# Patient Record
Sex: FEMALE | Race: WHITE | NOT HISPANIC OR LATINO | Employment: OTHER | URBAN - METROPOLITAN AREA
[De-identification: names, ages, dates, MRNs, and addresses within clinical notes are randomized per-mention and may not be internally consistent; named-entity substitution may affect disease eponyms.]

---

## 2023-02-16 PROBLEM — R04.0 EPISTAXIS, RECURRENT: Status: ACTIVE | Noted: 2023-02-16

## 2023-02-16 PROBLEM — R11.0 NAUSEA IN ADULT: Status: ACTIVE | Noted: 2023-02-16

## 2023-02-16 PROBLEM — S42.209A FRACTURE, HUMERUS, PROXIMAL: Status: ACTIVE | Noted: 2023-02-16

## 2023-02-16 PROBLEM — S42.309A FRACTURE, HUMERUS, SHAFT: Status: ACTIVE | Noted: 2023-02-16

## 2023-02-16 PROBLEM — D12.6 TUBULOVILLOUS ADENOMA OF COLON: Status: ACTIVE | Noted: 2023-02-16

## 2023-02-16 PROBLEM — L85.3 DRY SKIN: Status: ACTIVE | Noted: 2023-02-16

## 2023-02-16 PROBLEM — R41.3 COMPLAINTS OF MEMORY DISTURBANCE: Status: ACTIVE | Noted: 2023-02-16

## 2023-02-16 PROBLEM — N95.2 VAGINAL ATROPHY: Status: ACTIVE | Noted: 2023-02-16

## 2023-02-16 PROBLEM — R09.81 NASAL SINUS CONGESTION: Status: ACTIVE | Noted: 2023-02-16

## 2023-02-16 PROBLEM — J06.9 ACUTE URI: Status: ACTIVE | Noted: 2023-02-16

## 2023-02-16 PROBLEM — M54.50 LOW BACK PAIN, EPISODIC: Status: ACTIVE | Noted: 2023-02-16

## 2023-02-16 PROBLEM — Z96.649 PRESENCE OF HIP JOINT PROSTHESIS: Status: ACTIVE | Noted: 2023-02-16

## 2023-02-16 PROBLEM — R29.2 HYPERREFLEXIA: Status: ACTIVE | Noted: 2023-02-16

## 2023-02-16 PROBLEM — E03.9 HYPOTHYROIDISM: Status: ACTIVE | Noted: 2023-02-16

## 2023-02-16 PROBLEM — E78.5 HYPERLIPIDEMIA: Status: ACTIVE | Noted: 2023-02-16

## 2023-02-16 PROBLEM — S90.829A BLISTER OF FOOT: Status: ACTIVE | Noted: 2023-02-16

## 2023-02-16 PROBLEM — M25.569 KNEE PAIN: Status: ACTIVE | Noted: 2023-02-16

## 2023-02-16 PROBLEM — S42.293D: Status: ACTIVE | Noted: 2023-02-16

## 2023-02-16 PROBLEM — J02.9 SORE THROAT: Status: ACTIVE | Noted: 2023-02-16

## 2023-02-16 PROBLEM — N76.0 VAGINITIS: Status: ACTIVE | Noted: 2023-02-16

## 2023-02-16 PROBLEM — K58.9 IBS (IRRITABLE BOWEL SYNDROME): Status: ACTIVE | Noted: 2023-02-16

## 2023-02-16 PROBLEM — J34.2 NASAL SEPTAL DEVIATION: Status: ACTIVE | Noted: 2023-02-16

## 2023-02-16 PROBLEM — R26.81 GAIT INSTABILITY: Status: ACTIVE | Noted: 2023-02-16

## 2023-02-16 PROBLEM — I10 BENIGN ESSENTIAL HTN: Status: ACTIVE | Noted: 2023-02-16

## 2023-02-16 PROBLEM — K21.9 ESOPHAGEAL REFLUX: Status: ACTIVE | Noted: 2023-02-16

## 2023-02-16 PROBLEM — K59.00 CONSTIPATION: Status: ACTIVE | Noted: 2023-02-16

## 2023-02-16 PROBLEM — N81.10 FEMALE BLADDER PROLAPSE: Status: ACTIVE | Noted: 2023-02-16

## 2023-02-16 PROBLEM — J30.9 ALLERGIC RHINITIS: Status: ACTIVE | Noted: 2023-02-16

## 2023-02-16 PROBLEM — M79.604 RIGHT LEG PAIN: Status: ACTIVE | Noted: 2023-02-16

## 2023-02-16 PROBLEM — K63.5 COLON POLYP: Status: ACTIVE | Noted: 2023-02-16

## 2023-02-16 PROBLEM — S62.101A WRIST FRACTURE, RIGHT: Status: ACTIVE | Noted: 2023-02-16

## 2023-02-16 PROBLEM — M25.539 WRIST PAIN: Status: ACTIVE | Noted: 2023-02-16

## 2023-02-16 PROBLEM — J34.89 NASAL OBSTRUCTION: Status: ACTIVE | Noted: 2023-02-16

## 2023-02-16 PROBLEM — R30.0 DYSURIA: Status: ACTIVE | Noted: 2023-02-16

## 2023-02-16 PROBLEM — F09 COGNITIVE DYSFUNCTION: Status: ACTIVE | Noted: 2023-02-16

## 2023-02-16 PROBLEM — S62.628A CLOSED FRACTURE OF MIDDLE PHALANX OF LITTLE FINGER: Status: ACTIVE | Noted: 2023-02-16

## 2023-02-16 PROBLEM — M81.0 OSTEOPOROSIS, POST-MENOPAUSAL: Status: ACTIVE | Noted: 2023-02-16

## 2023-02-16 PROBLEM — R29.6 FREQUENT FALLS: Status: ACTIVE | Noted: 2023-02-16

## 2023-02-16 PROBLEM — R41.3 IMPAIRED MEMORY: Status: ACTIVE | Noted: 2023-02-16

## 2023-02-16 PROBLEM — N95.1 HOT FLASHES, MENOPAUSAL: Status: ACTIVE | Noted: 2023-02-16

## 2023-02-16 PROBLEM — M54.16 LUMBAR RADICULITIS: Status: ACTIVE | Noted: 2023-02-16

## 2023-02-16 PROBLEM — L70.9 ADULT ACNE: Status: ACTIVE | Noted: 2023-02-16

## 2023-02-16 PROBLEM — R05.8 COUGH WITH SPUTUM: Status: ACTIVE | Noted: 2023-02-16

## 2023-02-16 PROBLEM — H91.90 HOH (HARD OF HEARING): Status: ACTIVE | Noted: 2023-02-16

## 2023-02-16 PROBLEM — E55.9 VITAMIN D DEFICIENCY: Status: ACTIVE | Noted: 2023-02-16

## 2023-02-16 PROBLEM — S32.000A COMPRESSION FRACTURE OF LUMBAR VERTEBRA (MULTI): Status: ACTIVE | Noted: 2023-02-16

## 2023-02-16 PROBLEM — N39.0 URINARY TRACT INFECTION: Status: ACTIVE | Noted: 2023-02-16

## 2023-02-16 PROBLEM — D64.9 ANEMIA: Status: ACTIVE | Noted: 2023-02-16

## 2023-02-16 PROBLEM — M54.9 BACK PAIN: Status: ACTIVE | Noted: 2023-02-16

## 2023-02-16 PROBLEM — K03.0 DENTAL ATTRITION, EXCESSIVE: Status: ACTIVE | Noted: 2023-02-16

## 2023-02-16 PROBLEM — R07.89 ATYPICAL CHEST PAIN: Status: ACTIVE | Noted: 2023-02-16

## 2023-02-16 PROBLEM — R39.9 UTI SYMPTOMS: Status: ACTIVE | Noted: 2023-02-16

## 2023-02-16 PROBLEM — N95.0 POSTMENOPAUSAL BLEEDING: Status: ACTIVE | Noted: 2023-02-16

## 2023-02-16 PROBLEM — M79.641 PAIN OF RIGHT HAND: Status: ACTIVE | Noted: 2023-02-16

## 2023-02-16 PROBLEM — R05.9 COUGH: Status: ACTIVE | Noted: 2023-02-16

## 2023-02-16 PROBLEM — R10.32 LEFT GROIN PAIN: Status: ACTIVE | Noted: 2023-02-16

## 2023-02-16 PROBLEM — R19.7 DIARRHEA: Status: ACTIVE | Noted: 2023-02-16

## 2023-02-16 PROBLEM — U07.1 COVID-19 VIRUS INFECTION: Status: ACTIVE | Noted: 2023-02-16

## 2023-02-16 PROBLEM — J34.3 HYPERTROPHY OF INFERIOR NASAL TURBINATE: Status: ACTIVE | Noted: 2023-02-16

## 2023-02-16 PROBLEM — H93.92 LESION OF LEFT EAR: Status: ACTIVE | Noted: 2023-02-16

## 2023-02-16 PROBLEM — M25.559 HIP PAIN: Status: ACTIVE | Noted: 2023-02-16

## 2023-02-16 PROBLEM — R93.89 ABNORMAL CHEST CT: Status: ACTIVE | Noted: 2023-02-16

## 2023-02-16 PROBLEM — I51.9 HEART PROBLEM: Status: ACTIVE | Noted: 2023-02-16

## 2023-02-16 PROBLEM — K92.1 BLOOD IN STOOL: Status: ACTIVE | Noted: 2023-02-16

## 2023-02-16 PROBLEM — M17.12 LEFT KNEE DJD: Status: ACTIVE | Noted: 2023-02-16

## 2023-02-16 PROBLEM — S52.539A CLOSED COLLES' FRACTURE: Status: ACTIVE | Noted: 2023-02-16

## 2023-02-16 PROBLEM — D12.6 TUBULAR ADENOMA OF COLON: Status: ACTIVE | Noted: 2023-02-16

## 2023-02-16 PROBLEM — M47.816 LUMBAR SPONDYLOSIS: Status: ACTIVE | Noted: 2023-02-16

## 2023-02-16 PROBLEM — N39.0 RECURRENT UTI: Status: ACTIVE | Noted: 2023-02-16

## 2023-02-16 PROBLEM — R10.9 ABDOMINAL PAIN: Status: ACTIVE | Noted: 2023-02-16

## 2023-02-16 PROBLEM — M16.9 DEGENERATIVE JOINT DISEASE (DJD) OF HIP: Status: ACTIVE | Noted: 2023-02-16

## 2023-02-16 PROBLEM — M17.9 DEGENERATIVE ARTHRITIS OF KNEE: Status: ACTIVE | Noted: 2023-02-16

## 2023-02-16 PROBLEM — I47.10 SUPRAVENTRICULAR TACHYCARDIA (CMS-HCC): Status: ACTIVE | Noted: 2023-02-16

## 2023-02-16 PROBLEM — J45.909 ASTHMA (HHS-HCC): Status: ACTIVE | Noted: 2023-02-16

## 2023-02-16 PROBLEM — M12.9 ARTHROPATHY: Status: ACTIVE | Noted: 2023-02-16

## 2023-02-16 PROBLEM — R79.0 LOW IRON STORES: Status: ACTIVE | Noted: 2023-02-16

## 2023-02-16 PROBLEM — N89.8 VAGINA ITCHING: Status: ACTIVE | Noted: 2023-02-16

## 2023-02-16 PROBLEM — K62.5 RECTAL HEMORRHAGE: Status: ACTIVE | Noted: 2023-02-16

## 2023-02-16 PROBLEM — F41.9 ANXIETY: Status: ACTIVE | Noted: 2023-02-16

## 2023-02-16 PROBLEM — J01.90 ACUTE SINUSITIS: Status: ACTIVE | Noted: 2023-02-16

## 2023-02-16 RX ORDER — METOPROLOL SUCCINATE 100 MG/1
1 TABLET, EXTENDED RELEASE ORAL DAILY
COMMUNITY
Start: 2018-09-30 | End: 2023-09-08

## 2023-02-16 RX ORDER — ADAPALENE 1 MG/G
GEL TOPICAL
COMMUNITY
Start: 2021-09-13 | End: 2023-05-24

## 2023-02-16 RX ORDER — DEXTROMETHORPHAN HYDROBROMIDE, GUAIFENESIN 5; 100 MG/5ML; MG/5ML
650 LIQUID ORAL 3 TIMES DAILY
COMMUNITY
Start: 2020-02-25 | End: 2023-05-24

## 2023-02-16 RX ORDER — LEVOTHYROXINE SODIUM 25 UG/1
25 TABLET ORAL
COMMUNITY
Start: 2014-06-20 | End: 2023-05-11 | Stop reason: SDUPTHER

## 2023-02-16 RX ORDER — ASPIRIN 81 MG/1
1 TABLET ORAL DAILY
COMMUNITY
Start: 2018-09-27 | End: 2023-05-24

## 2023-02-16 RX ORDER — ONDANSETRON 4 MG/1
4 TABLET, FILM COATED ORAL EVERY 6 HOURS PRN
COMMUNITY
End: 2023-05-24

## 2023-02-16 RX ORDER — LORATADINE 10 MG/1
1 TABLET ORAL DAILY
COMMUNITY
Start: 2018-05-01 | End: 2023-11-30 | Stop reason: SDUPTHER

## 2023-02-16 RX ORDER — NEOMYCIN SULFATE, POLYMYXIN B SULFATE, AND DEXAMETHASONE 3.5; 10000; 1 MG/G; [USP'U]/G; MG/G
OINTMENT OPHTHALMIC
COMMUNITY
Start: 2022-09-27 | End: 2023-05-24

## 2023-02-16 RX ORDER — BUSPIRONE HYDROCHLORIDE 5 MG/1
5 TABLET ORAL 2 TIMES DAILY
COMMUNITY
Start: 2021-01-13 | End: 2023-03-23 | Stop reason: SDUPTHER

## 2023-02-16 RX ORDER — MONTELUKAST SODIUM 10 MG/1
1 TABLET ORAL NIGHTLY
COMMUNITY
Start: 2014-11-24 | End: 2023-05-11 | Stop reason: SDUPTHER

## 2023-02-16 RX ORDER — SODIUM CHLORIDE 0.65 %
2 AEROSOL, SPRAY (ML) NASAL 2 TIMES DAILY
COMMUNITY
End: 2023-05-24

## 2023-02-16 RX ORDER — ERGOCALCIFEROL 1.25 MG/1
1.25 CAPSULE ORAL
COMMUNITY
Start: 2021-01-13 | End: 2023-11-30 | Stop reason: SDUPTHER

## 2023-02-16 RX ORDER — OMEPRAZOLE 40 MG/1
1 CAPSULE, DELAYED RELEASE ORAL DAILY
COMMUNITY
Start: 2016-11-28 | End: 2023-03-23 | Stop reason: SDUPTHER

## 2023-02-16 RX ORDER — CALCIUM CARBONATE 600 MG
600 TABLET ORAL 2 TIMES DAILY
COMMUNITY
Start: 2021-01-13 | End: 2023-09-22 | Stop reason: SDUPTHER

## 2023-02-16 RX ORDER — ALENDRONATE SODIUM 70 MG/1
70 TABLET ORAL
COMMUNITY
Start: 2018-06-27 | End: 2023-05-11 | Stop reason: SDUPTHER

## 2023-02-16 RX ORDER — IBUPROFEN 200 MG
200 TABLET ORAL EVERY 6 HOURS PRN
COMMUNITY
End: 2023-05-24

## 2023-02-16 RX ORDER — FLUTICASONE PROPIONATE 50 MCG
2 SPRAY, SUSPENSION (ML) NASAL DAILY
COMMUNITY
Start: 2021-06-14 | End: 2023-05-11 | Stop reason: SDUPTHER

## 2023-02-16 RX ORDER — ALBUTEROL SULFATE 90 UG/1
2 AEROSOL, METERED RESPIRATORY (INHALATION)
COMMUNITY
Start: 2013-04-03 | End: 2023-11-30 | Stop reason: SDUPTHER

## 2023-02-16 RX ORDER — DICYCLOMINE HYDROCHLORIDE 20 MG/1
1 TABLET ORAL EVERY 8 HOURS PRN
COMMUNITY
End: 2023-09-08

## 2023-03-23 DIAGNOSIS — F41.9 ANXIETY: ICD-10-CM

## 2023-03-23 DIAGNOSIS — K21.9 GASTROESOPHAGEAL REFLUX DISEASE, UNSPECIFIED WHETHER ESOPHAGITIS PRESENT: ICD-10-CM

## 2023-03-24 RX ORDER — OMEPRAZOLE 40 MG/1
40 CAPSULE, DELAYED RELEASE ORAL
Qty: 90 CAPSULE | Refills: 3 | Status: SHIPPED | OUTPATIENT
Start: 2023-03-24 | End: 2023-05-11 | Stop reason: SDUPTHER

## 2023-03-24 RX ORDER — BUSPIRONE HYDROCHLORIDE 5 MG/1
5 TABLET ORAL 2 TIMES DAILY
Qty: 180 TABLET | Refills: 3 | Status: SHIPPED | OUTPATIENT
Start: 2023-03-24 | End: 2023-05-11 | Stop reason: SDUPTHER

## 2023-03-29 ENCOUNTER — OFFICE VISIT (OUTPATIENT)
Dept: PRIMARY CARE | Facility: CLINIC | Age: 73
End: 2023-03-29
Payer: MEDICARE

## 2023-03-29 VITALS
SYSTOLIC BLOOD PRESSURE: 128 MMHG | HEART RATE: 63 BPM | RESPIRATION RATE: 18 BRPM | TEMPERATURE: 97.7 F | WEIGHT: 128 LBS | BODY MASS INDEX: 26.75 KG/M2 | OXYGEN SATURATION: 97 % | DIASTOLIC BLOOD PRESSURE: 72 MMHG

## 2023-03-29 DIAGNOSIS — I10 BENIGN ESSENTIAL HTN: ICD-10-CM

## 2023-03-29 DIAGNOSIS — F41.9 ANXIETY: ICD-10-CM

## 2023-03-29 DIAGNOSIS — M17.9 OSTEOARTHRITIS OF KNEE, UNSPECIFIED LATERALITY, UNSPECIFIED OSTEOARTHRITIS TYPE: ICD-10-CM

## 2023-03-29 DIAGNOSIS — E78.5 HYPERLIPIDEMIA, UNSPECIFIED HYPERLIPIDEMIA TYPE: Primary | ICD-10-CM

## 2023-03-29 DIAGNOSIS — E03.9 HYPOTHYROIDISM, UNSPECIFIED TYPE: ICD-10-CM

## 2023-03-29 DIAGNOSIS — F09 COGNITIVE DYSFUNCTION: ICD-10-CM

## 2023-03-29 PROCEDURE — 1159F MED LIST DOCD IN RCRD: CPT | Performed by: INTERNAL MEDICINE

## 2023-03-29 PROCEDURE — 1157F ADVNC CARE PLAN IN RCRD: CPT | Performed by: INTERNAL MEDICINE

## 2023-03-29 PROCEDURE — 1036F TOBACCO NON-USER: CPT | Performed by: INTERNAL MEDICINE

## 2023-03-29 PROCEDURE — 99214 OFFICE O/P EST MOD 30 MIN: CPT | Performed by: INTERNAL MEDICINE

## 2023-03-29 PROCEDURE — 3074F SYST BP LT 130 MM HG: CPT | Performed by: INTERNAL MEDICINE

## 2023-03-29 PROCEDURE — 3078F DIAST BP <80 MM HG: CPT | Performed by: INTERNAL MEDICINE

## 2023-03-29 ASSESSMENT — PATIENT HEALTH QUESTIONNAIRE - PHQ9
2. FEELING DOWN, DEPRESSED OR HOPELESS: NOT AT ALL
1. LITTLE INTEREST OR PLEASURE IN DOING THINGS: NOT AT ALL
SUM OF ALL RESPONSES TO PHQ9 QUESTIONS 1 AND 2: 0

## 2023-03-29 ASSESSMENT — PAIN SCALES - GENERAL: PAINLEVEL: 0-NO PAIN

## 2023-03-29 NOTE — PROGRESS NOTES
My nurse note reviewed. Patient is here for:  6 month FUV     Doing ok   Lives with    Uses 4 wheel walker    No recent falls  Patient denies any shortness of breath, PND, orthopnea, chest pain , palpitation, syncope or edema in legs  Patient denies any weakness in extremities.. Denies any headache, visual symptoms , speech problems or  tremors . No TIA or stroke like symptoms..    Now worried that her insurance might change     Objective   /72 (BP Location: Left arm, Patient Position: Sitting)   Pulse 63   Temp 36.5 °C (97.7 °F)   Resp 18   Wt 58.1 kg (128 lb)   SpO2 97%   BMI 26.75 kg/m²   Not in acute distress  CVS: S1 S2 + , no S3. No loud heart murmur appreciated. Lungs clear, No edema  Low IQ as before   Assessment:    1. Hyperlipidemia, unspecified hyperlipidemia type -hx of   2. Benign essential HTN -controlled     3. Osteoarthritis of knee, unspecified laterality, unspecified osteoarthritis type -hx of. Doing ok   4. Hypothyroidism, unspecified type -on med   5. Anxiety -hx of   6. Cognitive dysfunction -hx of . No change        Plan:   Current medications are effective. advised to continue current medications.  Blood test from October discussed   Overall doing ok  F/U in Sept for Medicare wellness

## 2023-03-29 NOTE — PATIENT INSTRUCTIONS
Plan:   Current medications are effective. advised to continue current medications.  Blood test from October discussed   Overall doing ok  F/U in Sept for Medicare wellness

## 2023-05-11 DIAGNOSIS — E03.9 HYPOTHYROIDISM, UNSPECIFIED TYPE: ICD-10-CM

## 2023-05-11 DIAGNOSIS — J45.909 MILD ASTHMA, UNSPECIFIED WHETHER COMPLICATED, UNSPECIFIED WHETHER PERSISTENT (HHS-HCC): ICD-10-CM

## 2023-05-11 DIAGNOSIS — M81.0 OSTEOPOROSIS, UNSPECIFIED OSTEOPOROSIS TYPE, UNSPECIFIED PATHOLOGICAL FRACTURE PRESENCE: Primary | ICD-10-CM

## 2023-05-11 DIAGNOSIS — F41.9 ANXIETY: ICD-10-CM

## 2023-05-11 DIAGNOSIS — K21.9 GASTROESOPHAGEAL REFLUX DISEASE, UNSPECIFIED WHETHER ESOPHAGITIS PRESENT: ICD-10-CM

## 2023-05-15 RX ORDER — BUSPIRONE HYDROCHLORIDE 5 MG/1
5 TABLET ORAL 2 TIMES DAILY
Qty: 180 TABLET | Refills: 3 | Status: SHIPPED | OUTPATIENT
Start: 2023-05-15 | End: 2023-11-30 | Stop reason: SDUPTHER

## 2023-05-15 RX ORDER — MONTELUKAST SODIUM 10 MG/1
10 TABLET ORAL NIGHTLY
Qty: 90 TABLET | Refills: 0 | Status: SHIPPED | OUTPATIENT
Start: 2023-05-15 | End: 2023-11-30 | Stop reason: SDUPTHER

## 2023-05-15 RX ORDER — FLUTICASONE PROPIONATE 50 MCG
2 SPRAY, SUSPENSION (ML) NASAL DAILY
Qty: 16 G | Refills: 0 | Status: SHIPPED | OUTPATIENT
Start: 2023-05-15 | End: 2023-11-30 | Stop reason: SDUPTHER

## 2023-05-15 RX ORDER — OMEPRAZOLE 40 MG/1
40 CAPSULE, DELAYED RELEASE ORAL
Qty: 90 CAPSULE | Refills: 3 | Status: SHIPPED | OUTPATIENT
Start: 2023-05-15 | End: 2023-11-30 | Stop reason: SDUPTHER

## 2023-05-15 RX ORDER — ALENDRONATE SODIUM 70 MG/1
70 TABLET ORAL
Qty: 13 TABLET | Refills: 3 | Status: SHIPPED | OUTPATIENT
Start: 2023-05-15 | End: 2023-05-24

## 2023-05-15 RX ORDER — LEVOTHYROXINE SODIUM 25 UG/1
25 TABLET ORAL
Qty: 90 TABLET | Refills: 0 | Status: SHIPPED | OUTPATIENT
Start: 2023-05-15 | End: 2023-08-22 | Stop reason: SDUPTHER

## 2023-05-24 ENCOUNTER — OFFICE VISIT (OUTPATIENT)
Dept: PRIMARY CARE | Facility: CLINIC | Age: 73
End: 2023-05-24
Payer: MEDICARE

## 2023-05-24 VITALS
SYSTOLIC BLOOD PRESSURE: 130 MMHG | RESPIRATION RATE: 18 BRPM | TEMPERATURE: 97.3 F | HEART RATE: 67 BPM | OXYGEN SATURATION: 97 % | WEIGHT: 130 LBS | DIASTOLIC BLOOD PRESSURE: 70 MMHG | BODY MASS INDEX: 38.07 KG/M2

## 2023-05-24 DIAGNOSIS — K92.1 BLOOD IN STOOL: ICD-10-CM

## 2023-05-24 DIAGNOSIS — E66.01 OBESITY, MORBID (MULTI): ICD-10-CM

## 2023-05-24 DIAGNOSIS — I47.10 SUPRAVENTRICULAR TACHYCARDIA (CMS-HCC): ICD-10-CM

## 2023-05-24 DIAGNOSIS — L72.0 EPIDERMAL CYST OF NECK: Primary | ICD-10-CM

## 2023-05-24 PROBLEM — M79.671 FOOT PAIN, BILATERAL: Status: ACTIVE | Noted: 2023-05-24

## 2023-05-24 PROBLEM — M54.9 BACKACHE: Status: ACTIVE | Noted: 2019-02-01

## 2023-05-24 PROBLEM — R45.89 FLAT AFFECT: Status: ACTIVE | Noted: 2023-05-24

## 2023-05-24 PROBLEM — K64.9 HEMORRHOIDS: Status: ACTIVE | Noted: 2019-02-01

## 2023-05-24 PROBLEM — R26.89 ANTALGIC GAIT: Status: ACTIVE | Noted: 2023-05-24

## 2023-05-24 PROBLEM — L03.90 CELLULITIS: Status: ACTIVE | Noted: 2019-02-01

## 2023-05-24 PROBLEM — R23.2 FLUSHING: Status: ACTIVE | Noted: 2019-02-01

## 2023-05-24 PROBLEM — K21.9 GASTROESOPHAGEAL REFLUX DISEASE: Status: ACTIVE | Noted: 2019-02-01

## 2023-05-24 PROBLEM — J32.1 CHRONIC FRONTAL SINUSITIS: Status: ACTIVE | Noted: 2019-02-01

## 2023-05-24 PROBLEM — R47.89: Status: ACTIVE | Noted: 2023-05-24

## 2023-05-24 PROBLEM — K62.5 HEMORRHAGE OF RECTUM AND ANUS: Status: ACTIVE | Noted: 2019-02-01

## 2023-05-24 PROBLEM — H90.3 SENSORINEURAL HEARING LOSS (SNHL) OF BOTH EARS: Status: ACTIVE | Noted: 2023-05-24

## 2023-05-24 PROBLEM — I10 ESSENTIAL HYPERTENSION: Status: ACTIVE | Noted: 2023-05-24

## 2023-05-24 PROBLEM — K29.90 GASTRODUODENITIS: Status: ACTIVE | Noted: 2019-02-01

## 2023-05-24 PROBLEM — J31.0 CHRONIC RHINITIS: Status: ACTIVE | Noted: 2023-05-24

## 2023-05-24 PROBLEM — K29.70 GASTRITIS: Status: ACTIVE | Noted: 2019-02-01

## 2023-05-24 PROBLEM — R10.30 LOWER ABDOMINAL PAIN: Status: ACTIVE | Noted: 2023-05-24

## 2023-05-24 PROBLEM — M20.42 ACQUIRED HAMMER TOE OF LEFT FOOT: Status: ACTIVE | Noted: 2023-05-24

## 2023-05-24 PROBLEM — G47.00 INSOMNIA: Status: ACTIVE | Noted: 2019-02-01

## 2023-05-24 PROBLEM — J32.0 CHRONIC MAXILLARY SINUSITIS: Status: ACTIVE | Noted: 2019-02-01

## 2023-05-24 PROBLEM — R53.83 MALAISE AND FATIGUE: Status: ACTIVE | Noted: 2019-02-01

## 2023-05-24 PROBLEM — I51.7 CARDIOMEGALY: Status: ACTIVE | Noted: 2023-05-24

## 2023-05-24 PROBLEM — R10.9 ABDOMINAL DISCOMFORT: Status: ACTIVE | Noted: 2023-05-24

## 2023-05-24 PROBLEM — R53.81 MALAISE AND FATIGUE: Status: ACTIVE | Noted: 2019-02-01

## 2023-05-24 PROBLEM — R10.2 FEMALE PELVIC PAIN: Status: ACTIVE | Noted: 2023-05-24

## 2023-05-24 PROBLEM — M79.672 FOOT PAIN, BILATERAL: Status: ACTIVE | Noted: 2023-05-24

## 2023-05-24 PROBLEM — K59.09 CHRONIC CONSTIPATION: Status: ACTIVE | Noted: 2023-05-24

## 2023-05-24 PROBLEM — R26.89 IMBALANCE: Status: ACTIVE | Noted: 2023-05-24

## 2023-05-24 PROBLEM — J34.89 NASAL DRYNESS: Status: ACTIVE | Noted: 2023-05-24

## 2023-05-24 PROBLEM — R04.0 EPISTAXIS: Status: ACTIVE | Noted: 2019-02-01

## 2023-05-24 PROBLEM — L60.3 DYSTROPHIC NAIL: Status: ACTIVE | Noted: 2023-05-24

## 2023-05-24 PROBLEM — N95.1 MENOPAUSAL SYMPTOMS: Status: ACTIVE | Noted: 2023-05-24

## 2023-05-24 PROBLEM — D36.9 MULTIPLE ADENOMATOUS POLYPS: Status: ACTIVE | Noted: 2023-05-24

## 2023-05-24 PROBLEM — F32.A DEPRESSIVE DISORDER: Status: ACTIVE | Noted: 2019-02-01

## 2023-05-24 PROCEDURE — 3078F DIAST BP <80 MM HG: CPT | Performed by: INTERNAL MEDICINE

## 2023-05-24 PROCEDURE — 1159F MED LIST DOCD IN RCRD: CPT | Performed by: INTERNAL MEDICINE

## 2023-05-24 PROCEDURE — 1036F TOBACCO NON-USER: CPT | Performed by: INTERNAL MEDICINE

## 2023-05-24 PROCEDURE — 99214 OFFICE O/P EST MOD 30 MIN: CPT | Performed by: INTERNAL MEDICINE

## 2023-05-24 PROCEDURE — 3075F SYST BP GE 130 - 139MM HG: CPT | Performed by: INTERNAL MEDICINE

## 2023-05-24 PROCEDURE — 1157F ADVNC CARE PLAN IN RCRD: CPT | Performed by: INTERNAL MEDICINE

## 2023-05-24 RX ORDER — MULTIVIT-MIN/IRON FUM/FOLIC AC 7.5 MG-4
1 TABLET ORAL DAILY
COMMUNITY
End: 2023-11-30 | Stop reason: SDUPTHER

## 2023-05-24 ASSESSMENT — PATIENT HEALTH QUESTIONNAIRE - PHQ9
SUM OF ALL RESPONSES TO PHQ9 QUESTIONS 1 AND 2: 0
2. FEELING DOWN, DEPRESSED OR HOPELESS: NOT AT ALL
1. LITTLE INTEREST OR PLEASURE IN DOING THINGS: NOT AT ALL

## 2023-05-24 NOTE — PROGRESS NOTES
My nurse note reviewed. Patient is here for:  Follow-up (Pt stated something on her back)       Wants back to be checked     She thinks there might be blood in stool once few weeks ago. Not anymore.   patient denies any abdominal pain, tenderness, nausea, vomiting, change in bowel habits or blood in stool.  Had colonoscopy done in 2021, showed small polyp and diverticulosis . Discussed with pt. Advised to repeat it in 5 yrs .   Hx of obesity   Hx of svt, no issue with it currently   OBJECTIVE :  /70   Pulse 67   Temp 36.3 °C (97.3 °F)   Resp 18   Wt 59 kg (130 lb)   SpO2 97%   BMI 38.07 kg/m²   There is a small black dot , puntum , from old cyst  . No redeness . No tenderness  Abdomen: soft, non tender. No organomegaly noted. BS +.       Assessment:    1. Epidermal cyst of neck -reassured   2. Obesity, morbid (CMS/HCC) -Discussed about obesity and complications related to it. Discussed about weight reduction and regular exercise to decrease weight. Questions related to it answered to patient's satisfaction.    3. Supraventricular tachycardia (CMS/HCC) -hx of . Stable now   4. Blood in stool -? One episode. Will watch it and do further work up if recur         Plan:   Reassured for cyst in back of neck  Call if any blood in stool , then will consider further evaluation  Current medications are effective. advised to continue current medications.  F/U as scheduled in Sept

## 2023-06-01 DIAGNOSIS — M81.0 OSTEOPOROSIS, POST-MENOPAUSAL: Primary | ICD-10-CM

## 2023-06-01 RX ORDER — ALENDRONATE SODIUM 70 MG/1
70 TABLET ORAL
COMMUNITY
End: 2023-06-01 | Stop reason: SDUPTHER

## 2023-06-01 RX ORDER — ALENDRONATE SODIUM 70 MG/1
70 TABLET ORAL
Qty: 13 TABLET | Refills: 3 | Status: SHIPPED | OUTPATIENT
Start: 2023-06-01 | End: 2023-11-30 | Stop reason: SDUPTHER

## 2023-06-01 NOTE — TELEPHONE ENCOUNTER
Pt. Asking for refill on Fosamax 70 mg sent to [facility].  Rx pended.    Last OV: 5/24  Next OV: 9/28

## 2023-08-22 DIAGNOSIS — E03.9 HYPOTHYROIDISM, UNSPECIFIED TYPE: ICD-10-CM

## 2023-08-22 RX ORDER — LEVOTHYROXINE SODIUM 25 UG/1
25 TABLET ORAL
Qty: 90 TABLET | Refills: 0 | Status: SHIPPED | OUTPATIENT
Start: 2023-08-22 | End: 2023-11-30 | Stop reason: SDUPTHER

## 2023-09-05 ENCOUNTER — PATIENT OUTREACH (OUTPATIENT)
Dept: CARE COORDINATION | Facility: CLINIC | Age: 73
End: 2023-09-05
Payer: COMMERCIAL

## 2023-09-05 DIAGNOSIS — I10 HYPERTENSION, BENIGN: ICD-10-CM

## 2023-09-05 DIAGNOSIS — E03.9 HYPOTHYROIDISM, UNSPECIFIED TYPE: ICD-10-CM

## 2023-09-05 DIAGNOSIS — R07.9 CHEST PAIN, UNSPECIFIED TYPE: ICD-10-CM

## 2023-09-05 DIAGNOSIS — F32.A ANXIETY AND DEPRESSION: ICD-10-CM

## 2023-09-05 DIAGNOSIS — R07.89 ATYPICAL CHEST PAIN: ICD-10-CM

## 2023-09-05 DIAGNOSIS — F41.9 ANXIETY AND DEPRESSION: ICD-10-CM

## 2023-09-05 RX ORDER — METOPROLOL SUCCINATE 25 MG/1
25 TABLET, EXTENDED RELEASE ORAL DAILY
COMMUNITY
Start: 2023-09-03 | End: 2023-09-28 | Stop reason: SDUPTHER

## 2023-09-05 RX ORDER — LOSARTAN POTASSIUM 50 MG/1
50 TABLET ORAL DAILY
COMMUNITY
Start: 2023-09-03 | End: 2023-09-28 | Stop reason: SDUPTHER

## 2023-09-05 RX ORDER — ASPIRIN 81 MG/1
81 TABLET ORAL DAILY
COMMUNITY
End: 2023-11-22 | Stop reason: SDUPTHER

## 2023-09-05 RX ORDER — ATORVASTATIN CALCIUM 80 MG/1
80 TABLET, FILM COATED ORAL NIGHTLY
COMMUNITY
Start: 2023-09-03 | End: 2023-09-28 | Stop reason: SDUPTHER

## 2023-09-05 NOTE — PROGRESS NOTES
Discharge Facility:[facility]  Discharge Diagnosis:R07.9 Chest pain, R07.89 Atypical pain, E03.9 Hypothyroidism, I10 Hypertension, benign, F41.9/F32.A Anxiety and depression  Admission Date:9/2/23  Discharge Date: 9/3/23    PCP Appointment Date:9/8/23  Specialist Appointment Date:   Hospital Encounter and Summary:  not available at this time   See discharge assessment below for further details    Engagement  Call Start Time: 1320 (9/5/2023  1:19 PM)    Medications  Medications reviewed with patient/caregiver?: Yes (9/5/2023  1:19 PM)  Is the patient having any side effects they believe may be caused by any medication additions or changes?: No (9/5/2023  1:19 PM)  Does the patient have all medications ordered at discharge?: Yes (9/5/2023  1:19 PM)  Care Management Interventions: Provided patient education (9/5/2023  1:19 PM)  Is the patient taking all medications as directed (includes completed medication regime)?: Yes (9/5/2023  1:19 PM)  Care Management Interventions: Provided patient education (9/5/2023  1:17 PM)  Medication Comments: Aspirin, Atorvastatin, Losartan, Metoprolol (9/5/2023  1:19 PM)    Appointments  Does the patient have a primary care provider?: Yes (9/5/2023  1:19 PM)  Care Management Interventions: Verified appointment date/time/provider (9/5/2023  1:19 PM)  Has the patient kept scheduled appointments due by today?: Yes (9/5/2023  1:19 PM)  Care Management Interventions: Advised patient to keep appointment; Educated on importance of keeping appointment (9/5/2023  1:19 PM)    Self Management  Has home health visited the patient within 72 hours of discharge?: Not applicable (9/5/2023  1:19 PM)    Patient Teaching  Does the patient have access to their discharge instructions?: Yes (9/5/2023  1:19 PM)  Care Management Interventions: Reviewed instructions with patient (9/5/2023  1:19 PM)  What is the patient's perception of their health status since discharge?: Same (9/5/2023  1:19 PM)  Is the  patient/caregiver able to teach back the hierarchy of who to call/visit for symptoms/problems? PCP, Specialist, Home Health nurse, Urgent Care, ED, 911: Yes (9/5/2023  1:19 PM)

## 2023-09-08 ENCOUNTER — OFFICE VISIT (OUTPATIENT)
Dept: PRIMARY CARE | Facility: CLINIC | Age: 73
End: 2023-09-08
Payer: MEDICARE

## 2023-09-08 VITALS
OXYGEN SATURATION: 96 % | HEART RATE: 72 BPM | RESPIRATION RATE: 18 BRPM | SYSTOLIC BLOOD PRESSURE: 104 MMHG | WEIGHT: 126 LBS | DIASTOLIC BLOOD PRESSURE: 64 MMHG | BODY MASS INDEX: 26.45 KG/M2 | TEMPERATURE: 96.3 F | HEIGHT: 58 IN

## 2023-09-08 DIAGNOSIS — R07.89 ATYPICAL CHEST PAIN: Primary | ICD-10-CM

## 2023-09-08 DIAGNOSIS — Z09 HOSPITAL DISCHARGE FOLLOW-UP: ICD-10-CM

## 2023-09-08 DIAGNOSIS — N60.29 FIBROADENOSIS OF BREAST, UNSPECIFIED LATERALITY: ICD-10-CM

## 2023-09-08 DIAGNOSIS — I10 BENIGN ESSENTIAL HTN: ICD-10-CM

## 2023-09-08 PROCEDURE — 3078F DIAST BP <80 MM HG: CPT | Performed by: INTERNAL MEDICINE

## 2023-09-08 PROCEDURE — 3074F SYST BP LT 130 MM HG: CPT | Performed by: INTERNAL MEDICINE

## 2023-09-08 PROCEDURE — 1125F AMNT PAIN NOTED PAIN PRSNT: CPT | Performed by: INTERNAL MEDICINE

## 2023-09-08 PROCEDURE — 99214 OFFICE O/P EST MOD 30 MIN: CPT | Performed by: INTERNAL MEDICINE

## 2023-09-08 PROCEDURE — 1157F ADVNC CARE PLAN IN RCRD: CPT | Performed by: INTERNAL MEDICINE

## 2023-09-08 PROCEDURE — G0008 ADMIN INFLUENZA VIRUS VAC: HCPCS | Performed by: INTERNAL MEDICINE

## 2023-09-08 PROCEDURE — 1159F MED LIST DOCD IN RCRD: CPT | Performed by: INTERNAL MEDICINE

## 2023-09-08 PROCEDURE — 90662 IIV NO PRSV INCREASED AG IM: CPT | Performed by: INTERNAL MEDICINE

## 2023-09-08 PROCEDURE — 1036F TOBACCO NON-USER: CPT | Performed by: INTERNAL MEDICINE

## 2023-09-08 ASSESSMENT — PAIN SCALES - GENERAL: PAINLEVEL: 9

## 2023-09-08 NOTE — PROGRESS NOTES
"My nurse note reviewed. Patient is here for:  Hospital Follow-up     She was in hosp with atypical chest pain , acute MI was ruled out. Her BP was high so started on metoprolol and losartan along with other meds. They could not do CT angio due to high D Dimer as had difficulty with IV . She is doing ok now . No sob. Gets pain in R upper chest at times . Scheduled to have mammogram in few days .   No fever, chills, sob, n,v,   Available hospital discharge summary , pertinent lab and radiological results were reviewed and discussed with patient . Questions related to it answered to patient's satisfaction.  OBJECTIVE :  /64 (BP Location: Right arm, Patient Position: Sitting, BP Cuff Size: Adult)   Pulse 72   Temp 35.7 °C (96.3 °F)   Resp 18   Ht 1.473 m (4' 10\")   Wt 57.2 kg (126 lb)   SpO2 96%   BMI 26.33 kg/m²   CVS: S1 S2 + , no S3. No loud heart murmur appreciated. Lungs clear, No edema  Thickening of breast tissue on R breast exam , tenderness+ , also some pain near R upper costal cartilage area . No nipple discharge from R breast         Assessment:  1. Atypical chest pain - ? Msk pain vs other etionlogy . Discussed with pt. Otc med for pain discussed   2. Hospital discharge follow-up    3. Benign essential HTN - controlled  With med. Continue it   4. Fibroadenosis of breast, unspecified laterality - scheduled for mammogram      Plan;  Current medications are effective. advised to continue current medications.  Available hospital discharge summary , pertinent lab and radiological results were reviewed and discussed with patient . Questions related to it answered to patient's satisfaction.  BP is under control now. Will monitor  Has appt for mammogram   Flu shot  Keep appt with me as scheduled for medicare wellness  "

## 2023-09-08 NOTE — PATIENT INSTRUCTIONS
Plan;  Current medications are effective. advised to continue current medications.  Available hospital discharge summary , pertinent lab and radiological results were reviewed and discussed with patient . Questions related to it answered to patient's satisfaction.  BP is under control now. Will monitor  Has appt for mammogram   Flu shot   Keep appt with me as scheduled for medicare wellness

## 2023-09-11 DIAGNOSIS — R10.9 ABDOMINAL PAIN, UNSPECIFIED ABDOMINAL LOCATION: Primary | ICD-10-CM

## 2023-09-11 RX ORDER — DICYCLOMINE HYDROCHLORIDE 20 MG/1
TABLET ORAL
COMMUNITY
End: 2023-09-11 | Stop reason: SDUPTHER

## 2023-09-11 RX ORDER — DICYCLOMINE HYDROCHLORIDE 20 MG/1
20 TABLET ORAL EVERY 8 HOURS PRN
Qty: 60 TABLET | Refills: 1 | Status: SHIPPED | OUTPATIENT
Start: 2023-09-11 | End: 2023-11-30 | Stop reason: SDUPTHER

## 2023-09-13 ENCOUNTER — PATIENT OUTREACH (OUTPATIENT)
Dept: CARE COORDINATION | Facility: CLINIC | Age: 73
End: 2023-09-13
Payer: COMMERCIAL

## 2023-09-13 NOTE — PROGRESS NOTES
Call regarding appt. with PCP on 9/8/23 after hospitalization.  At time of outreach call the patient feels as if their condition has improved since last visit.  Reviewed the PCP appointment with the pt and addressed any questions or concerns.

## 2023-09-22 DIAGNOSIS — M54.50 LOW BACK PAIN WITHOUT SCIATICA, UNSPECIFIED BACK PAIN LATERALITY, UNSPECIFIED CHRONICITY: Primary | ICD-10-CM

## 2023-09-22 RX ORDER — CALCIUM CARBONATE 600 MG
600 TABLET ORAL 2 TIMES DAILY
Qty: 180 TABLET | Refills: 3 | Status: SHIPPED | OUTPATIENT
Start: 2023-09-22 | End: 2023-11-30 | Stop reason: SDUPTHER

## 2023-09-28 ENCOUNTER — OFFICE VISIT (OUTPATIENT)
Dept: PRIMARY CARE | Facility: CLINIC | Age: 73
End: 2023-09-28
Payer: MEDICARE

## 2023-09-28 VITALS
TEMPERATURE: 97.2 F | WEIGHT: 127.2 LBS | SYSTOLIC BLOOD PRESSURE: 136 MMHG | HEART RATE: 57 BPM | BODY MASS INDEX: 26.58 KG/M2 | DIASTOLIC BLOOD PRESSURE: 83 MMHG | OXYGEN SATURATION: 95 %

## 2023-09-28 DIAGNOSIS — Z71.89 ADVANCE DIRECTIVE DISCUSSED WITH PATIENT: ICD-10-CM

## 2023-09-28 DIAGNOSIS — Z00.00 MEDICARE ANNUAL WELLNESS VISIT, SUBSEQUENT: Primary | ICD-10-CM

## 2023-09-28 DIAGNOSIS — I10 BENIGN ESSENTIAL HTN: ICD-10-CM

## 2023-09-28 DIAGNOSIS — Z13.6 SCREENING FOR CARDIOVASCULAR CONDITION: ICD-10-CM

## 2023-09-28 DIAGNOSIS — Z13.39 ALCOHOL SCREENING: ICD-10-CM

## 2023-09-28 DIAGNOSIS — Z00.00 ANNUAL PHYSICAL EXAM: ICD-10-CM

## 2023-09-28 DIAGNOSIS — F09 COGNITIVE DYSFUNCTION: ICD-10-CM

## 2023-09-28 DIAGNOSIS — Z13.31 DEPRESSION SCREEN: ICD-10-CM

## 2023-09-28 DIAGNOSIS — R26.81 GAIT INSTABILITY: ICD-10-CM

## 2023-09-28 DIAGNOSIS — E78.5 HYPERLIPIDEMIA, UNSPECIFIED HYPERLIPIDEMIA TYPE: ICD-10-CM

## 2023-09-28 DIAGNOSIS — E03.9 ACQUIRED HYPOTHYROIDISM: ICD-10-CM

## 2023-09-28 DIAGNOSIS — Z98.890 HISTORY OF HIP SURGERY: ICD-10-CM

## 2023-09-28 DIAGNOSIS — M25.552 PAIN OF LEFT HIP: ICD-10-CM

## 2023-09-28 PROCEDURE — 99397 PER PM REEVAL EST PAT 65+ YR: CPT | Performed by: INTERNAL MEDICINE

## 2023-09-28 PROCEDURE — G0444 DEPRESSION SCREEN ANNUAL: HCPCS | Performed by: INTERNAL MEDICINE

## 2023-09-28 PROCEDURE — 99497 ADVNCD CARE PLAN 30 MIN: CPT | Performed by: INTERNAL MEDICINE

## 2023-09-28 PROCEDURE — G0439 PPPS, SUBSEQ VISIT: HCPCS | Performed by: INTERNAL MEDICINE

## 2023-09-28 PROCEDURE — 1170F FXNL STATUS ASSESSED: CPT | Performed by: INTERNAL MEDICINE

## 2023-09-28 PROCEDURE — 99214 OFFICE O/P EST MOD 30 MIN: CPT | Performed by: INTERNAL MEDICINE

## 2023-09-28 PROCEDURE — G0442 ANNUAL ALCOHOL SCREEN 15 MIN: HCPCS | Performed by: INTERNAL MEDICINE

## 2023-09-28 PROCEDURE — G0446 INTENS BEHAVE THER CARDIO DX: HCPCS | Performed by: INTERNAL MEDICINE

## 2023-09-28 PROCEDURE — 3079F DIAST BP 80-89 MM HG: CPT | Performed by: INTERNAL MEDICINE

## 2023-09-28 PROCEDURE — 1159F MED LIST DOCD IN RCRD: CPT | Performed by: INTERNAL MEDICINE

## 2023-09-28 PROCEDURE — 1036F TOBACCO NON-USER: CPT | Performed by: INTERNAL MEDICINE

## 2023-09-28 PROCEDURE — 1125F AMNT PAIN NOTED PAIN PRSNT: CPT | Performed by: INTERNAL MEDICINE

## 2023-09-28 PROCEDURE — 3075F SYST BP GE 130 - 139MM HG: CPT | Performed by: INTERNAL MEDICINE

## 2023-09-28 RX ORDER — ATORVASTATIN CALCIUM 80 MG/1
80 TABLET, FILM COATED ORAL NIGHTLY
Qty: 90 TABLET | Refills: 3 | Status: SHIPPED | OUTPATIENT
Start: 2023-09-28 | End: 2023-10-04 | Stop reason: SDUPTHER

## 2023-09-28 RX ORDER — LOSARTAN POTASSIUM 50 MG/1
50 TABLET ORAL DAILY
Qty: 90 TABLET | Refills: 3 | Status: SHIPPED | OUTPATIENT
Start: 2023-09-28 | End: 2023-10-04 | Stop reason: SDUPTHER

## 2023-09-28 RX ORDER — METOPROLOL SUCCINATE 25 MG/1
25 TABLET, EXTENDED RELEASE ORAL DAILY
Qty: 90 TABLET | Refills: 3 | Status: SHIPPED | OUTPATIENT
Start: 2023-09-28 | End: 2023-10-04 | Stop reason: SDUPTHER

## 2023-09-28 ASSESSMENT — ACTIVITIES OF DAILY LIVING (ADL)
DOING_HOUSEWORK: INDEPENDENT
GROCERY_SHOPPING: INDEPENDENT
TAKING_MEDICATION: INDEPENDENT
DRESSING: INDEPENDENT
BATHING: INDEPENDENT
MANAGING_FINANCES: INDEPENDENT

## 2023-09-28 NOTE — PATIENT INSTRUCTIONS
Plan:  Medicare wellness done  Physical done   Mammogram results reviewed  Blood tests ordered   Upto date on bone density   Requested prescription/s refill done to the pharmacy of patient choice .  Xray of L hip ordered  F/U 6 months and in 12 months for Medicare wellness

## 2023-09-28 NOTE — PROGRESS NOTES
My nurse note reviewed. Patient is here for:  Annual Exam (Medicare Wellness)   Here for medicare wellness, physical and follow up  Here with    Hx of cognitive issues and low IQ   Patient denies any shortness of breath, PND, orthopnea, chest pain , palpitation, syncope or edema in legs  patient denies any abdominal pain, tenderness, nausea, vomiting, change in bowel habits or blood in stool.  Patient denies any weakness in extremities.. Denies any headache, visual symptoms , speech problems or  tremors . No TIA or stroke like symptoms..    Taking meds ok   Occ pain in R upper thigh pain , has had bilat hip surgery in past .     During Medicare wellness apart from looking at assessment done by nurse,  I also asked following :    Alcohol use : Alcohol screening  was  done during this visit. Patient is not having any issue with increase alcohol use . No ETOH abuse was observed by history . Doesn't drink at all    HIV test: patient was not found to be high risk for HIV     Cognitive issue : yes     Advanced Directive: Patient has advanced directive including living will and Health care power of . Health POA is brother Markell  . All questions related to it answered. Total time > 16 min.     I have reviewed all active medications patient is currently on . Questions related to medication answered to patient's satisfaction.      Over the past 2 weeks, how often have you been bothered by any of the following problems?  Little interest or pleasure in doing things: Not at all  Feeling down, depressed, or hopeless: Not at all  Functional Ability/Level of Safety  Cognitive Impairment Observed: cognitive impairment observed  Nutrition and Exercise  Current Diet: Well Balanced Diet  Adequate Fluid Intake: Yes  Caffeine: No  Exercise Frequency: Infrequently  IADL's  Grocery Shopping: Independent  Doing Housework: Independent  Taking Medication: Independent  Managing Finances: Independent  [place] Home Safety Risk  Factors  Home Safety Risk Factors: None       REVIEW OF SYSTEMS:   All other systems have been reviewed and are negative in relation to patient's complaint and other than what is mentioned in History of present illness.      During Medicare wellness patients chronic diagnosis were reviewed and questions related to it answered to patient's satisfaction . Risk factors for heart, stroke were discussed with patient . Discussion about preventative tests were done with patient also . pain issue was discussed as appropriate for patient .  ASCVD score is 14.4 %     OBJECTIVE :    /83 (BP Location: Right arm, Patient Position: Sitting, BP Cuff Size: Adult)   Pulse 57   Temp 36.2 °C (97.2 °F) (Temporal)   Wt 57.7 kg (127 lb 3.2 oz)   SpO2 95%   BMI 26.58 kg/m²   Vitals noted , has low IQ  Not in acute distress  Conj Pink, No icterus  ears exam normal  Neck:No Cervical LN enlargement, No Thyroid enlargement No carotid bruit  Lungs: good air entry bilaterally, no rales or rhonchi  CVS: S1 S2 + , no S3. No loud heart murmur heard.   Breast examination: Breasts are symmetric. No dominant or discrete suspicious masses noted in breast area.  No nipple changes or discharge noted. Has thickening of breast tissue with some tenderness  Abdomen: Soft, non tender , BS +. no organomegaly , no CVA tenderness  MSK: No spine tenderness or muscle tenderness noted on gross examination  CNS: Pt is alert, moving all 4 extremities. no motor weakness or abnormal movements noted on gross examination.  Extremities: No edema, No calf tenderness, Papo's sign negative. DTR + knee and wrists, Rhomberg's neg    Assessment:  1. Medicare annual wellness visit, subsequent  Done.       2. Annual physical exam  Done. Tests ordered      3. Alcohol screening        4. Depression screen        5. Advance directive discussed with patient        6. Screening for cardiovascular condition  Discussed       7. Benign essential HTN  losartan (Cozaar) 50 mg  tablet    metoprolol succinate XL (Toprol-XL) 25 mg 24 hr tablet      8. Hyperlipidemia, unspecified hyperlipidemia type  atorvastatin (Lipitor) 80 mg tablet    CBC and Auto Differential    Basic Metabolic Panel    Lipid Panel Non-Fasting    Thyroid Stimulating Hormone    Hepatic Function Panel      9. Gait instability  Uses 4 wheel walker       10. Acquired hypothyroidism  Hx of.       11. Cognitive dysfunction  Hx of.       12. Pain of left hip  XR hip left 2 or 3 views      13. History of hip surgery  XR hip left 2 or 3 views              Plan:  Medicare wellness done  Physical done   Mammogram results reviewed  Blood tests ordered   Upto date on bone density   Requested prescription/s refill done to the pharmacy of patient choice .  Xray of L hip ordered  F/U 6 months and in 12 months for Medicare wellness

## 2023-10-04 DIAGNOSIS — I10 BENIGN ESSENTIAL HTN: ICD-10-CM

## 2023-10-04 DIAGNOSIS — E78.5 HYPERLIPIDEMIA, UNSPECIFIED HYPERLIPIDEMIA TYPE: ICD-10-CM

## 2023-10-04 RX ORDER — LOSARTAN POTASSIUM 50 MG/1
50 TABLET ORAL DAILY
Qty: 90 TABLET | Refills: 3 | Status: SHIPPED | OUTPATIENT
Start: 2023-10-04 | End: 2023-11-30 | Stop reason: SDUPTHER

## 2023-10-04 RX ORDER — ATORVASTATIN CALCIUM 80 MG/1
80 TABLET, FILM COATED ORAL NIGHTLY
Qty: 90 TABLET | Refills: 3 | Status: SHIPPED | OUTPATIENT
Start: 2023-10-04 | End: 2023-11-30 | Stop reason: SDUPTHER

## 2023-10-04 RX ORDER — METOPROLOL SUCCINATE 25 MG/1
25 TABLET, EXTENDED RELEASE ORAL DAILY
Qty: 90 TABLET | Refills: 3 | Status: SHIPPED | OUTPATIENT
Start: 2023-10-04 | End: 2023-11-30 | Stop reason: SDUPTHER

## 2023-10-04 NOTE — TELEPHONE ENCOUNTER
Per patient, insurance will not pay for medications through [facility], needs medications resent to local pharmacy. Please send ASAP.

## 2023-10-13 ENCOUNTER — LAB (OUTPATIENT)
Dept: LAB | Facility: LAB | Age: 73
End: 2023-10-13
Payer: COMMERCIAL

## 2023-10-13 DIAGNOSIS — E78.5 HYPERLIPIDEMIA, UNSPECIFIED HYPERLIPIDEMIA TYPE: ICD-10-CM

## 2023-10-13 LAB
ALBUMIN SERPL BCP-MCNC: 4.5 G/DL (ref 3.4–5)
ALP SERPL-CCNC: 90 U/L (ref 33–136)
ALT SERPL W P-5'-P-CCNC: 19 U/L (ref 7–45)
ANION GAP SERPL CALC-SCNC: 11 MMOL/L (ref 10–20)
AST SERPL W P-5'-P-CCNC: 26 U/L (ref 9–39)
BASOPHILS # BLD AUTO: 0.04 X10*3/UL (ref 0–0.1)
BASOPHILS NFR BLD AUTO: 0.6 %
BILIRUB DIRECT SERPL-MCNC: 0.1 MG/DL (ref 0–0.3)
BILIRUB SERPL-MCNC: 0.5 MG/DL (ref 0–1.2)
BUN SERPL-MCNC: 16 MG/DL (ref 6–23)
CALCIUM SERPL-MCNC: 10.2 MG/DL (ref 8.6–10.3)
CHLORIDE SERPL-SCNC: 109 MMOL/L (ref 98–107)
CHOLEST SERPL-MCNC: 105 MG/DL (ref 0–199)
CHOLESTEROL/HDL RATIO: 2.6
CO2 SERPL-SCNC: 28 MMOL/L (ref 21–32)
CREAT SERPL-MCNC: 0.73 MG/DL (ref 0.5–1.05)
EOSINOPHIL # BLD AUTO: 0.15 X10*3/UL (ref 0–0.7)
EOSINOPHIL NFR BLD AUTO: 2.1 %
ERYTHROCYTE [DISTWIDTH] IN BLOOD BY AUTOMATED COUNT: 13.2 % (ref 11.5–14.5)
GFR SERPL CREATININE-BSD FRML MDRD: 89 ML/MIN/1.73M*2
GLUCOSE SERPL-MCNC: 84 MG/DL (ref 74–99)
HCT VFR BLD AUTO: 40.7 % (ref 36–46)
HDLC SERPL-MCNC: 40.9 MG/DL
HGB BLD-MCNC: 12.8 G/DL (ref 12–16)
IMM GRANULOCYTES # BLD AUTO: 0.02 X10*3/UL (ref 0–0.7)
IMM GRANULOCYTES NFR BLD AUTO: 0.3 % (ref 0–0.9)
LYMPHOCYTES # BLD AUTO: 2.3 X10*3/UL (ref 1.2–4.8)
LYMPHOCYTES NFR BLD AUTO: 32.9 %
MCH RBC QN AUTO: 28.8 PG (ref 26–34)
MCHC RBC AUTO-ENTMCNC: 31.4 G/DL (ref 32–36)
MCV RBC AUTO: 92 FL (ref 80–100)
MONOCYTES # BLD AUTO: 0.52 X10*3/UL (ref 0.1–1)
MONOCYTES NFR BLD AUTO: 7.4 %
NEUTROPHILS # BLD AUTO: 3.97 X10*3/UL (ref 1.2–7.7)
NEUTROPHILS NFR BLD AUTO: 56.7 %
NON-HDL CHOLESTEROL: 64 MG/DL (ref 0–149)
NRBC BLD-RTO: 0 /100 WBCS (ref 0–0)
PLATELET # BLD AUTO: 296 X10*3/UL (ref 150–450)
PMV BLD AUTO: 11 FL (ref 7.5–11.5)
POTASSIUM SERPL-SCNC: 4.4 MMOL/L (ref 3.5–5.3)
PROT SERPL-MCNC: 7 G/DL (ref 6.4–8.2)
RBC # BLD AUTO: 4.44 X10*6/UL (ref 4–5.2)
SODIUM SERPL-SCNC: 144 MMOL/L (ref 136–145)
TSH SERPL-ACNC: 1.23 MIU/L (ref 0.44–3.98)
WBC # BLD AUTO: 7 X10*3/UL (ref 4.4–11.3)

## 2023-10-13 PROCEDURE — 82465 ASSAY BLD/SERUM CHOLESTEROL: CPT

## 2023-10-13 PROCEDURE — 84443 ASSAY THYROID STIM HORMONE: CPT

## 2023-10-13 PROCEDURE — 83718 ASSAY OF LIPOPROTEIN: CPT

## 2023-10-13 PROCEDURE — 82248 BILIRUBIN DIRECT: CPT

## 2023-10-13 PROCEDURE — 36415 COLL VENOUS BLD VENIPUNCTURE: CPT

## 2023-10-13 PROCEDURE — 85025 COMPLETE CBC W/AUTO DIFF WBC: CPT

## 2023-10-13 PROCEDURE — 80053 COMPREHEN METABOLIC PANEL: CPT

## 2023-10-18 ENCOUNTER — PATIENT OUTREACH (OUTPATIENT)
Dept: CARE COORDINATION | Facility: CLINIC | Age: 73
End: 2023-10-18
Payer: COMMERCIAL

## 2023-11-07 ENCOUNTER — APPOINTMENT (OUTPATIENT)
Dept: PRIMARY CARE | Facility: CLINIC | Age: 73
End: 2023-11-07
Payer: COMMERCIAL

## 2023-11-16 ENCOUNTER — APPOINTMENT (OUTPATIENT)
Dept: PRIMARY CARE | Facility: CLINIC | Age: 73
End: 2023-11-16
Payer: COMMERCIAL

## 2023-11-22 ENCOUNTER — OFFICE VISIT (OUTPATIENT)
Dept: PRIMARY CARE | Facility: CLINIC | Age: 73
End: 2023-11-22
Payer: MEDICARE

## 2023-11-22 VITALS — WEIGHT: 120 LBS | BODY MASS INDEX: 25.08 KG/M2 | SYSTOLIC BLOOD PRESSURE: 124 MMHG | DIASTOLIC BLOOD PRESSURE: 66 MMHG

## 2023-11-22 DIAGNOSIS — F09 COGNITIVE DYSFUNCTION: ICD-10-CM

## 2023-11-22 DIAGNOSIS — E03.9 ACQUIRED HYPOTHYROIDISM: ICD-10-CM

## 2023-11-22 DIAGNOSIS — F41.9 ANXIETY: ICD-10-CM

## 2023-11-22 DIAGNOSIS — I47.10 SVT (SUPRAVENTRICULAR TACHYCARDIA) (CMS-HCC): ICD-10-CM

## 2023-11-22 DIAGNOSIS — I10 BENIGN ESSENTIAL HTN: Primary | ICD-10-CM

## 2023-11-22 PROCEDURE — 3078F DIAST BP <80 MM HG: CPT | Performed by: INTERNAL MEDICINE

## 2023-11-22 PROCEDURE — 99214 OFFICE O/P EST MOD 30 MIN: CPT | Performed by: INTERNAL MEDICINE

## 2023-11-22 PROCEDURE — 1125F AMNT PAIN NOTED PAIN PRSNT: CPT | Performed by: INTERNAL MEDICINE

## 2023-11-22 PROCEDURE — 1036F TOBACCO NON-USER: CPT | Performed by: INTERNAL MEDICINE

## 2023-11-22 PROCEDURE — 3074F SYST BP LT 130 MM HG: CPT | Performed by: INTERNAL MEDICINE

## 2023-11-22 PROCEDURE — 1159F MED LIST DOCD IN RCRD: CPT | Performed by: INTERNAL MEDICINE

## 2023-11-22 RX ORDER — ASPIRIN 81 MG/1
81 TABLET ORAL DAILY
Qty: 100 TABLET | Refills: 3 | Status: SHIPPED | OUTPATIENT
Start: 2023-11-22 | End: 2023-11-30 | Stop reason: SDUPTHER

## 2023-11-22 ASSESSMENT — PATIENT HEALTH QUESTIONNAIRE - PHQ9
2. FEELING DOWN, DEPRESSED OR HOPELESS: NOT AT ALL
SUM OF ALL RESPONSES TO PHQ9 QUESTIONS 1 AND 2: 0
1. LITTLE INTEREST OR PLEASURE IN DOING THINGS: NOT AT ALL

## 2023-11-22 NOTE — PATIENT INSTRUCTIONS
Plan  Current medications are effective. advised to continue current medications.  Overall doing well. '  Requested prescription/s refill done to the pharmacy of patient choice .  F/U in April or as needed

## 2023-11-22 NOTE — PROGRESS NOTES
My nurse note reviewed. Patient is here for:  Follow-up (Blood pressure and med refills)   Here with    Hx of low IQ  Takes meds regularly   Patient denies any shortness of breath, PND, orthopnea, chest pain , palpitation, syncope or edema in legs  Needs refill on aspirin  OBJECTIVE :  /66   Wt 54.4 kg (120 lb)   BMI 25.08 kg/m²   Low IQ as before   CVS: S1 S2 + , no S3. No loud heart murmur appreciated. Lungs clear, No edema  Abd soft, non tender    Assessment:  1. Benign essential HTN -controlled     2. Cognitive dysfunction -hx of    3. Acquired hypothyroidism -on med.    4. Anxiety -hx of.    5. SVT (supraventricular tachycardia) -hx of. Stable.              Plan  Current medications are effective. advised to continue current medications.  Overall doing well. '  Requested prescription/s refill done to the pharmacy of patient choice .  F/U in April or as needed

## 2023-11-29 ENCOUNTER — PATIENT OUTREACH (OUTPATIENT)
Dept: CARE COORDINATION | Facility: CLINIC | Age: 73
End: 2023-11-29
Payer: COMMERCIAL

## 2023-11-30 DIAGNOSIS — E78.5 HYPERLIPIDEMIA, UNSPECIFIED HYPERLIPIDEMIA TYPE: ICD-10-CM

## 2023-11-30 DIAGNOSIS — J45.909 MILD ASTHMA, UNSPECIFIED WHETHER COMPLICATED, UNSPECIFIED WHETHER PERSISTENT (HHS-HCC): ICD-10-CM

## 2023-11-30 DIAGNOSIS — I10 BENIGN ESSENTIAL HTN: ICD-10-CM

## 2023-11-30 DIAGNOSIS — M81.0 OSTEOPOROSIS, POST-MENOPAUSAL: ICD-10-CM

## 2023-11-30 DIAGNOSIS — M54.50 LOW BACK PAIN WITHOUT SCIATICA, UNSPECIFIED BACK PAIN LATERALITY, UNSPECIFIED CHRONICITY: ICD-10-CM

## 2023-11-30 DIAGNOSIS — K21.9 GASTROESOPHAGEAL REFLUX DISEASE, UNSPECIFIED WHETHER ESOPHAGITIS PRESENT: ICD-10-CM

## 2023-11-30 DIAGNOSIS — F41.9 ANXIETY: ICD-10-CM

## 2023-11-30 DIAGNOSIS — E55.9 VITAMIN D DEFICIENCY: Primary | ICD-10-CM

## 2023-11-30 DIAGNOSIS — R10.9 ABDOMINAL PAIN, UNSPECIFIED ABDOMINAL LOCATION: ICD-10-CM

## 2023-11-30 DIAGNOSIS — E03.9 HYPOTHYROIDISM, UNSPECIFIED TYPE: ICD-10-CM

## 2023-12-01 DIAGNOSIS — J45.909 MILD ASTHMA, UNSPECIFIED WHETHER COMPLICATED, UNSPECIFIED WHETHER PERSISTENT (HHS-HCC): ICD-10-CM

## 2023-12-01 RX ORDER — OMEPRAZOLE 40 MG/1
40 CAPSULE, DELAYED RELEASE ORAL
Qty: 90 CAPSULE | Refills: 3 | Status: SHIPPED | OUTPATIENT
Start: 2023-12-01 | End: 2023-12-13 | Stop reason: SDUPTHER

## 2023-12-01 RX ORDER — FLUTICASONE PROPIONATE 50 MCG
2 SPRAY, SUSPENSION (ML) NASAL DAILY
Qty: 16 G | Refills: 0 | Status: SHIPPED | OUTPATIENT
Start: 2023-12-01 | End: 2023-12-13 | Stop reason: SDUPTHER

## 2023-12-01 RX ORDER — CALCIUM CARBONATE 600 MG
TABLET ORAL
Qty: 180 TABLET | Refills: 3 | Status: SHIPPED | OUTPATIENT
Start: 2023-12-01 | End: 2023-12-13 | Stop reason: SDUPTHER

## 2023-12-01 RX ORDER — ALENDRONATE SODIUM 70 MG/1
70 TABLET ORAL
Qty: 13 TABLET | Refills: 3 | Status: SHIPPED | OUTPATIENT
Start: 2023-12-01 | End: 2023-12-13 | Stop reason: SDUPTHER

## 2023-12-01 RX ORDER — ALBUTEROL SULFATE 90 UG/1
2 AEROSOL, METERED RESPIRATORY (INHALATION) EVERY 6 HOURS PRN
Qty: 18 G | Refills: 3 | Status: SHIPPED | OUTPATIENT
Start: 2023-12-01 | End: 2023-12-04

## 2023-12-01 RX ORDER — METOPROLOL SUCCINATE 25 MG/1
25 TABLET, EXTENDED RELEASE ORAL DAILY
Qty: 90 TABLET | Refills: 3 | Status: SHIPPED | OUTPATIENT
Start: 2023-12-01 | End: 2023-12-13 | Stop reason: SDUPTHER

## 2023-12-01 RX ORDER — LOSARTAN POTASSIUM 50 MG/1
50 TABLET ORAL DAILY
Qty: 90 TABLET | Refills: 3 | Status: SHIPPED | OUTPATIENT
Start: 2023-12-01 | End: 2023-12-13 | Stop reason: SDUPTHER

## 2023-12-01 RX ORDER — LEVOTHYROXINE SODIUM 25 UG/1
25 TABLET ORAL
Qty: 90 TABLET | Refills: 0 | Status: SHIPPED | OUTPATIENT
Start: 2023-12-01 | End: 2023-12-13 | Stop reason: SDUPTHER

## 2023-12-01 RX ORDER — BUSPIRONE HYDROCHLORIDE 5 MG/1
5 TABLET ORAL 2 TIMES DAILY
Qty: 180 TABLET | Refills: 3 | Status: SHIPPED | OUTPATIENT
Start: 2023-12-01 | End: 2023-12-13 | Stop reason: SDUPTHER

## 2023-12-01 RX ORDER — MONTELUKAST SODIUM 10 MG/1
10 TABLET ORAL NIGHTLY
Qty: 90 TABLET | Refills: 0 | Status: SHIPPED | OUTPATIENT
Start: 2023-12-01 | End: 2023-12-13 | Stop reason: SDUPTHER

## 2023-12-01 RX ORDER — LORATADINE 10 MG/1
10 TABLET ORAL DAILY
Qty: 90 TABLET | Refills: 3 | Status: SHIPPED | OUTPATIENT
Start: 2023-12-01 | End: 2023-12-13 | Stop reason: SDUPTHER

## 2023-12-01 RX ORDER — MULTIVIT-MIN/IRON FUM/FOLIC AC 7.5 MG-4
1 TABLET ORAL DAILY
Qty: 90 TABLET | Refills: 3 | Status: SHIPPED | OUTPATIENT
Start: 2023-12-01 | End: 2023-12-13 | Stop reason: SDUPTHER

## 2023-12-01 RX ORDER — DICYCLOMINE HYDROCHLORIDE 20 MG/1
20 TABLET ORAL EVERY 8 HOURS PRN
Qty: 60 TABLET | Refills: 1 | Status: SHIPPED | OUTPATIENT
Start: 2023-12-01 | End: 2023-12-13 | Stop reason: SDUPTHER

## 2023-12-01 RX ORDER — ATORVASTATIN CALCIUM 80 MG/1
80 TABLET, FILM COATED ORAL NIGHTLY
Qty: 90 TABLET | Refills: 3 | Status: SHIPPED | OUTPATIENT
Start: 2023-12-01 | End: 2023-12-13 | Stop reason: SDUPTHER

## 2023-12-01 RX ORDER — ERGOCALCIFEROL 1.25 MG/1
1.25 CAPSULE ORAL
Qty: 90 CAPSULE | Refills: 3 | Status: SHIPPED | OUTPATIENT
Start: 2023-12-01 | End: 2023-12-13 | Stop reason: SDUPTHER

## 2023-12-01 RX ORDER — ASPIRIN 81 MG/1
81 TABLET ORAL DAILY
Qty: 100 TABLET | Refills: 3 | Status: SHIPPED | OUTPATIENT
Start: 2023-12-01 | End: 2023-12-13 | Stop reason: SDUPTHER

## 2023-12-04 RX ORDER — ALBUTEROL SULFATE 90 UG/1
2 AEROSOL, METERED RESPIRATORY (INHALATION) EVERY 6 HOURS PRN
Qty: 18 G | Refills: 3 | Status: SHIPPED | OUTPATIENT
Start: 2023-12-04 | End: 2023-12-13 | Stop reason: SDUPTHER

## 2023-12-08 ENCOUNTER — APPOINTMENT (OUTPATIENT)
Dept: OPHTHALMOLOGY | Facility: CLINIC | Age: 73
End: 2023-12-08
Payer: MEDICARE

## 2023-12-08 ENCOUNTER — APPOINTMENT (OUTPATIENT)
Dept: RADIOLOGY | Facility: HOSPITAL | Age: 73
End: 2023-12-08
Payer: MEDICARE

## 2023-12-08 ENCOUNTER — HOSPITAL ENCOUNTER (EMERGENCY)
Facility: HOSPITAL | Age: 73
Discharge: HOME | End: 2023-12-09
Attending: STUDENT IN AN ORGANIZED HEALTH CARE EDUCATION/TRAINING PROGRAM
Payer: MEDICARE

## 2023-12-08 DIAGNOSIS — N39.0 URINARY TRACT INFECTION WITHOUT HEMATURIA, SITE UNSPECIFIED: ICD-10-CM

## 2023-12-08 DIAGNOSIS — R07.9 CHEST PAIN, UNSPECIFIED TYPE: Primary | ICD-10-CM

## 2023-12-08 LAB
ALBUMIN SERPL BCP-MCNC: 4 G/DL (ref 3.4–5)
ALP SERPL-CCNC: 87 U/L (ref 33–136)
ALT SERPL W P-5'-P-CCNC: 13 U/L (ref 7–45)
ANION GAP SERPL CALC-SCNC: 18 MMOL/L (ref 10–20)
APPEARANCE UR: ABNORMAL
AST SERPL W P-5'-P-CCNC: 25 U/L (ref 9–39)
BACTERIA #/AREA URNS AUTO: ABNORMAL /HPF
BASOPHILS # BLD AUTO: 0.03 X10*3/UL (ref 0–0.1)
BASOPHILS NFR BLD AUTO: 0.5 %
BILIRUB SERPL-MCNC: 0.2 MG/DL (ref 0–1.2)
BILIRUB UR STRIP.AUTO-MCNC: NEGATIVE MG/DL
BUN SERPL-MCNC: 13 MG/DL (ref 6–23)
CALCIUM SERPL-MCNC: 9.1 MG/DL (ref 8.6–10.3)
CAOX CRY #/AREA UR COMP ASSIST: ABNORMAL /HPF
CARDIAC TROPONIN I PNL SERPL HS: 5 NG/L (ref 0–13)
CARDIAC TROPONIN I PNL SERPL HS: 5 NG/L (ref 0–13)
CHLORIDE SERPL-SCNC: 111 MMOL/L (ref 98–107)
CO2 SERPL-SCNC: 19 MMOL/L (ref 21–32)
COLOR UR: YELLOW
CREAT SERPL-MCNC: 0.57 MG/DL (ref 0.5–1.05)
EOSINOPHIL # BLD AUTO: 0.19 X10*3/UL (ref 0–0.4)
EOSINOPHIL NFR BLD AUTO: 3.2 %
ERYTHROCYTE [DISTWIDTH] IN BLOOD BY AUTOMATED COUNT: 13.9 % (ref 11.5–14.5)
GFR SERPL CREATININE-BSD FRML MDRD: >90 ML/MIN/1.73M*2
GLUCOSE SERPL-MCNC: 127 MG/DL (ref 74–99)
GLUCOSE UR STRIP.AUTO-MCNC: NEGATIVE MG/DL
HCT VFR BLD AUTO: 41.7 % (ref 36–46)
HGB BLD-MCNC: 13.1 G/DL (ref 12–16)
HOLD SPECIMEN: NORMAL
IMM GRANULOCYTES # BLD AUTO: 0.01 X10*3/UL (ref 0–0.5)
IMM GRANULOCYTES NFR BLD AUTO: 0.2 % (ref 0–0.9)
KETONES UR STRIP.AUTO-MCNC: NEGATIVE MG/DL
LEUKOCYTE ESTERASE UR QL STRIP.AUTO: ABNORMAL
LYMPHOCYTES # BLD AUTO: 2.39 X10*3/UL (ref 0.8–3)
LYMPHOCYTES NFR BLD AUTO: 40 %
MAGNESIUM SERPL-MCNC: 2.14 MG/DL (ref 1.6–2.4)
MCH RBC QN AUTO: 29.4 PG (ref 26–34)
MCHC RBC AUTO-ENTMCNC: 31.4 G/DL (ref 32–36)
MCV RBC AUTO: 94 FL (ref 80–100)
MONOCYTES # BLD AUTO: 0.49 X10*3/UL (ref 0.05–0.8)
MONOCYTES NFR BLD AUTO: 8.2 %
MUCOUS THREADS #/AREA URNS AUTO: ABNORMAL /LPF
NEUTROPHILS # BLD AUTO: 2.86 X10*3/UL (ref 1.6–5.5)
NEUTROPHILS NFR BLD AUTO: 47.9 %
NITRITE UR QL STRIP.AUTO: NEGATIVE
NRBC BLD-RTO: 0 /100 WBCS (ref 0–0)
PH UR STRIP.AUTO: 5 [PH]
PLATELET # BLD AUTO: 241 X10*3/UL (ref 150–450)
POTASSIUM SERPL-SCNC: 4.1 MMOL/L (ref 3.5–5.3)
PROT SERPL-MCNC: 6.6 G/DL (ref 6.4–8.2)
PROT UR STRIP.AUTO-MCNC: NEGATIVE MG/DL
RBC # BLD AUTO: 4.46 X10*6/UL (ref 4–5.2)
RBC # UR STRIP.AUTO: NEGATIVE /UL
RBC #/AREA URNS AUTO: ABNORMAL /HPF
SODIUM SERPL-SCNC: 144 MMOL/L (ref 136–145)
SP GR UR STRIP.AUTO: 1.03
UROBILINOGEN UR STRIP.AUTO-MCNC: 2 MG/DL
WBC # BLD AUTO: 6 X10*3/UL (ref 4.4–11.3)
WBC #/AREA URNS AUTO: ABNORMAL /HPF

## 2023-12-08 PROCEDURE — 85025 COMPLETE CBC W/AUTO DIFF WBC: CPT | Performed by: PHYSICIAN ASSISTANT

## 2023-12-08 PROCEDURE — 80053 COMPREHEN METABOLIC PANEL: CPT | Performed by: PHYSICIAN ASSISTANT

## 2023-12-08 PROCEDURE — 2500000004 HC RX 250 GENERAL PHARMACY W/ HCPCS (ALT 636 FOR OP/ED): Performed by: STUDENT IN AN ORGANIZED HEALTH CARE EDUCATION/TRAINING PROGRAM

## 2023-12-08 PROCEDURE — 81003 URINALYSIS AUTO W/O SCOPE: CPT | Performed by: PHYSICIAN ASSISTANT

## 2023-12-08 PROCEDURE — 71046 X-RAY EXAM CHEST 2 VIEWS: CPT | Mod: FOREIGN READ | Performed by: RADIOLOGY

## 2023-12-08 PROCEDURE — 36415 COLL VENOUS BLD VENIPUNCTURE: CPT | Performed by: PHYSICIAN ASSISTANT

## 2023-12-08 PROCEDURE — 84484 ASSAY OF TROPONIN QUANT: CPT | Performed by: PHYSICIAN ASSISTANT

## 2023-12-08 PROCEDURE — 87086 URINE CULTURE/COLONY COUNT: CPT | Mod: PARLAB | Performed by: PHYSICIAN ASSISTANT

## 2023-12-08 PROCEDURE — 74176 CT ABD & PELVIS W/O CONTRAST: CPT

## 2023-12-08 PROCEDURE — 74176 CT ABD & PELVIS W/O CONTRAST: CPT | Mod: FOREIGN READ | Performed by: RADIOLOGY

## 2023-12-08 PROCEDURE — 99285 EMERGENCY DEPT VISIT HI MDM: CPT | Mod: 25

## 2023-12-08 PROCEDURE — 83735 ASSAY OF MAGNESIUM: CPT | Performed by: PHYSICIAN ASSISTANT

## 2023-12-08 PROCEDURE — 96365 THER/PROPH/DIAG IV INF INIT: CPT

## 2023-12-08 PROCEDURE — 71046 X-RAY EXAM CHEST 2 VIEWS: CPT | Mod: FR

## 2023-12-08 PROCEDURE — 99284 EMERGENCY DEPT VISIT MOD MDM: CPT | Performed by: STUDENT IN AN ORGANIZED HEALTH CARE EDUCATION/TRAINING PROGRAM

## 2023-12-08 RX ORDER — CEFTRIAXONE 1 G/50ML
1 INJECTION, SOLUTION INTRAVENOUS ONCE
Status: COMPLETED | OUTPATIENT
Start: 2023-12-08 | End: 2023-12-08

## 2023-12-08 RX ORDER — LIDOCAINE 560 MG/1
1 PATCH PERCUTANEOUS; TOPICAL; TRANSDERMAL DAILY
Status: DISCONTINUED | OUTPATIENT
Start: 2023-12-09 | End: 2023-12-09 | Stop reason: HOSPADM

## 2023-12-08 RX ADMIN — CEFTRIAXONE SODIUM 1 G: 1 INJECTION, SOLUTION INTRAVENOUS at 22:38

## 2023-12-08 ASSESSMENT — PAIN DESCRIPTION - ORIENTATION: ORIENTATION: LEFT

## 2023-12-08 ASSESSMENT — LIFESTYLE VARIABLES
HAVE PEOPLE ANNOYED YOU BY CRITICIZING YOUR DRINKING: NO
EVER FELT BAD OR GUILTY ABOUT YOUR DRINKING: NO
HAVE YOU EVER FELT YOU SHOULD CUT DOWN ON YOUR DRINKING: NO
EVER HAD A DRINK FIRST THING IN THE MORNING TO STEADY YOUR NERVES TO GET RID OF A HANGOVER: NO
REASON UNABLE TO ASSESS: NO

## 2023-12-08 ASSESSMENT — PAIN DESCRIPTION - ONSET: ONSET: GRADUAL

## 2023-12-08 ASSESSMENT — PAIN - FUNCTIONAL ASSESSMENT: PAIN_FUNCTIONAL_ASSESSMENT: 0-10

## 2023-12-08 ASSESSMENT — PAIN DESCRIPTION - FREQUENCY: FREQUENCY: CONSTANT/CONTINUOUS

## 2023-12-08 ASSESSMENT — PAIN DESCRIPTION - DESCRIPTORS: DESCRIPTORS: SHARP

## 2023-12-08 ASSESSMENT — COLUMBIA-SUICIDE SEVERITY RATING SCALE - C-SSRS
1. IN THE PAST MONTH, HAVE YOU WISHED YOU WERE DEAD OR WISHED YOU COULD GO TO SLEEP AND NOT WAKE UP?: NO
2. HAVE YOU ACTUALLY HAD ANY THOUGHTS OF KILLING YOURSELF?: NO
6. HAVE YOU EVER DONE ANYTHING, STARTED TO DO ANYTHING, OR PREPARED TO DO ANYTHING TO END YOUR LIFE?: NO

## 2023-12-08 ASSESSMENT — PAIN DESCRIPTION - PROGRESSION: CLINICAL_PROGRESSION: GRADUALLY WORSENING

## 2023-12-08 ASSESSMENT — PAIN DESCRIPTION - LOCATION: LOCATION: OTHER (COMMENT)

## 2023-12-08 ASSESSMENT — PAIN SCALES - GENERAL: PAINLEVEL_OUTOF10: 6

## 2023-12-08 ASSESSMENT — PAIN DESCRIPTION - PAIN TYPE: TYPE: ACUTE PAIN

## 2023-12-08 NOTE — ED TRIAGE NOTES
TRIAGE NOTE   I saw the patient as the Clinician in Triage and performed a brief history and physical exam, established acuity, and ordered appropriate tests to develop basic plan of care. Patient will be seen by an [facility], resident and/or physician who will independently evaluate the patient. Please see subsequent provider notes for further details and disposition.     Brief HPI: In brief, Halley Clements is a 73 y.o. female that presents for left-sided chest pain and around rib cage.  Patient thinks maybe it happened after she was lifting a heavy tub of close.  Patient states though she has been out of all of her medicines for the last 5 weeks.  She does not have a list with her.  Her primary care doctor is Dr. Branden raza.  She states she has tried to get a hold of them to no avail.  She denies any nausea or vomiting any weakness any headache any abdominal pain any bowel or bladder changes.  Dates she does have shoulder pain as well on the left side.  Chronic for her.       Focused PE:  - Constitutional: Alert and oriented x3.  In no acute distress, well-nourished and hydrated.  Cooperative.  - Skin: Pink, warm and dry.    -Neurological: Neurologically intact.    - Musculoskeletal: CRISTI x4, Normal gait. MSP´s intact.    - Cardiac: Regular rate rhythm.  - Pulmonary: Lungs clear bilaterally. No rales, rhonchi or wheezing.  No stridor or accessory muscle use.  - Abdomen: Abdomen soft and nontender with bowel sounds.  No rebound or guarding.  No CVA tenderness.    Note, physical exam may be limited by patient positioning sitting up in a chair.    Plan/MDM: I examined the patient in triage due to high volumes in the ER.  Labs, testing , and initial imaging based upon reported CC and focused exam      - For the remainder of the patient's workup and ED course, please see the main ED provider note. We discussed need for diagnostic testing including laboratory studies and imaging. We also discussed that they may be asked to wait  in the waiting room while these tests are pending. They understand that if they choose to leave without having the testing completed or resulted that we cannot rule out acute life threatening illnesses and the risks involved could lead to worsening condition, permanent disability or even death

## 2023-12-09 VITALS
DIASTOLIC BLOOD PRESSURE: 103 MMHG | OXYGEN SATURATION: 95 % | BODY MASS INDEX: 27.29 KG/M2 | HEIGHT: 58 IN | WEIGHT: 130 LBS | TEMPERATURE: 97.9 F | RESPIRATION RATE: 19 BRPM | HEART RATE: 72 BPM | SYSTOLIC BLOOD PRESSURE: 165 MMHG

## 2023-12-09 RX ORDER — CEPHALEXIN 500 MG/1
500 CAPSULE ORAL 2 TIMES DAILY
Qty: 20 CAPSULE | Refills: 0 | Status: SHIPPED | OUTPATIENT
Start: 2023-12-09 | End: 2023-12-19

## 2023-12-09 RX ORDER — LIDOCAINE 560 MG/1
1 PATCH PERCUTANEOUS; TOPICAL; TRANSDERMAL DAILY
Qty: 5 PATCH | Refills: 0 | Status: SHIPPED | OUTPATIENT
Start: 2023-12-09 | End: 2023-12-13 | Stop reason: SDUPTHER

## 2023-12-09 NOTE — ED PROVIDER NOTES
HPI   Chief Complaint   Patient presents with   • Flank Pain       Patient is a 73-year-old female past medical history of irritable bowel syndrome, hypertension presenting to the emergency department for left-sided chest pain.  She states that it is around her lower rib cage.  She was lifting up a heavy tub and subsequently developed pain on that side few days prior.  She has tried medications however has not had much relief.  She presented to the ER today for evaluation.  She denies any flank discomfort she denies any dysuria hematuria nausea vomiting abdominal pain or any other muscle aches and pains at this time.                          Benicia Coma Scale Score: 15                  Patient History   Past Medical History:   Diagnosis Date   • Age-related osteoporosis without current pathological fracture 11/07/2013    Osteoporosis   • Allergy, unspecified, initial encounter 11/07/2013    Allergic reaction   • Cellulitis, unspecified 11/07/2013    Cellulitis   • Chronic frontal sinusitis 11/07/2013    Chronic frontal sinusitis   • Conjunctival hemorrhage, unspecified eye 11/07/2013    Subconjunctival hemorrhage   • Contusion of unspecified thigh, initial encounter 11/07/2013    Contusion of thigh   • Depression, unspecified 11/07/2013    Depression   • Disorder of the skin and subcutaneous tissue, unspecified 11/07/2013    Skin disorder   • Encounter for immunization 11/07/2013    Need for DTP vaccine   • Encounter for screening for malignant neoplasm of colon     Encounter for screening colonoscopy   • Epistaxis 11/07/2013    Epistaxis   • Erythema intertrigo 11/07/2013    Intertrigo   • Flushing 11/07/2013    Flushing   • Furuncle of perineum 11/07/2013    Furuncle of perineum   • Gastritis, unspecified, without bleeding 11/07/2013    Gastritis   • Herpesviral infection, unspecified 11/07/2013    Herpes simplex   • Noninfective gastroenteritis and colitis, unspecified 11/07/2013    Noninfectious gastroenteritis    • Other allergy status, other than to drugs and biological substances     History of environmental allergies   • Other conditions influencing health status 11/07/2013    A Fall   • Other conditions influencing health status 11/07/2013    Rodent Bite   • Other fatigue 11/07/2013    Fatigue   • Other specified disorders of nose and nasal sinuses 10/19/2020    Nasal valve stenosis   • Other vitreous opacities, unspecified eye 11/07/2013    Vitreous floaters   • Otitis media, unspecified, unspecified ear 11/07/2013    Otitis media   • Pain in unspecified hip 11/07/2013    Joint pain, hip   • Person consulting for explanation of examination or test findings     Visit for review of DEXA scan   • Personal history of other diseases of the circulatory system     History of hypertension   • Personal history of other diseases of the female genital tract 10/04/2017    History of postmenopausal bleeding   • Personal history of other diseases of the female genital tract 02/26/2016    History of postmenopausal bleeding   • Personal history of other diseases of the musculoskeletal system and connective tissue 11/07/2013    History of backache   • Personal history of other diseases of the respiratory system 09/28/2022    History of deviated nasal septum   • Personal history of other diseases of the respiratory system 04/14/2015    History of acute sinusitis   • Personal history of other medical treatment     History of mammogram   • Personal history of other specified conditions     History of snoring   • Pneumonia, unspecified organism 11/07/2013    Pneumonitis   • Postmenopausal bleeding 11/07/2013    Postmenopausal bleeding   • Tinea pedis 11/07/2013    Tinea pedis   • Unspecified asthma, uncomplicated 11/07/2013    Asthma   • Unspecified blepharitis unspecified eye, unspecified eyelid 11/07/2013    Blepharitis   • Unspecified hemorrhoids 11/07/2013    Hemorrhoids   • Unspecified mental disorder due to known physiological  condition 09/20/2017    Cognitive dysfunction   • Unspecified nonsuppurative otitis media, unspecified ear 11/07/2013    Nonsuppurative otitis media   • Unspecified open wound of unspecified hand, initial encounter 11/07/2013    Open wound of hand   • Unspecified osteoarthritis, unspecified site 07/02/2014    Osteoarthrosis, unspecified whether generalized or localized, other specified sites   • Wedge compression fracture of T11-T12 vertebra, initial encounter for closed fracture (CMS/[facility]) 12/01/2021    Wedge compression fracture of T11-T12 vertebra, initial encounter for closed fracture     Past Surgical History:   Procedure Laterality Date   • HYSTERECTOMY  02/26/2016    Hysterectomy   • OTHER SURGICAL HISTORY  03/03/2014    Vaccines Prophylactic Need Against Bacterial Diseases   • OTHER SURGICAL HISTORY  07/18/2018    Reported Hx Of Hip Replacement - Bilateral   • OTHER SURGICAL HISTORY  11/07/2022    Arm surgery   • OTHER SURGICAL HISTORY  02/26/2016    Brain Surgery   • TUBAL LIGATION  10/04/2017    Tubal Ligation     Family History   Problem Relation Name Age of Onset   • Cancer Mother       Social History     Tobacco Use   • Smoking status: Never   • Smokeless tobacco: Never   Substance Use Topics   • Alcohol use: Not on file   • Drug use: Not on file       Physical Exam   ED Triage Vitals [12/08/23 1811]   Temp Heart Rate Resp BP   36.6 °C (97.9 °F) 81 15 169/83      SpO2 Temp src Heart Rate Source Patient Position   96 % -- -- --      BP Location FiO2 (%)     -- --       Physical Exam  Vitals reviewed.   Constitutional:       Appearance: Normal appearance.   HENT:      Head: Normocephalic and atraumatic.      Nose: Nose normal.      Mouth/Throat:      Mouth: Mucous membranes are moist.   Eyes:      Extraocular Movements: Extraocular movements intact.      Conjunctiva/sclera: Conjunctivae normal.   Cardiovascular:      Rate and Rhythm: Normal rate and regular rhythm.      Pulses: Normal pulses.      Heart  sounds: Normal heart sounds.   Pulmonary:      Effort: Pulmonary effort is normal.      Breath sounds: Normal breath sounds.   Chest:      Chest wall: Tenderness (left side) present.   Abdominal:      General: Abdomen is flat. Bowel sounds are normal.      Palpations: Abdomen is soft.   Musculoskeletal:         General: Normal range of motion.   Skin:     General: Skin is warm and dry.   Neurological:      Mental Status: She is alert and oriented to person, place, and time. Mental status is at baseline.      Cranial Nerves: Cranial nerves 2-12 are intact.      Sensory: Sensation is intact.      Motor: Motor function is intact.   Psychiatric:         Mood and Affect: Mood normal.         ED Course & MDM   ED Course as of 12/09/23 0036   Fri Dec 08, 2023   1945 73-year-old female history of irritable bowel syndrome hypertension presents emergency department for left-sided chest pain.  On examination vital signs are stable equal breath sounds bilaterally 2+ peripheral pulses no CVA tenderness.  Left rib tenderness to palpation.  Equal breath sounds bilaterally.  No crepitus to palpation.  2+ peripheral pulses differential diagnosis includes costochondritis.  Differential considered however unlikely as ACS.  Patient's presentation is not consistent with pulmonary embolism.  Workup was ordered by provider in triage which included urinalysis CBC CMP EKG troponin and chest x-ray.  Patient's EKG on my interpretation reveals sinus rhythm with a Q wave in lead III otherwise no acute ischemic changes sinus rhythm ventricular rate 66  QRS 82 QTc 436 [ZS]   Sat Dec 09, 2023   0033 Workup is revealed a urinary tract infection there is some calcium oxalate as well as the steroids with concern for nephrolithiasis CT imaging showed no evidence of nephrolithiasis on my interpretation official read showed no evidence of calculus.  There is not on emergent findings.  Patient has no midline tenderness to spine thus I do not  believe that there is any acute spinal process.  Patient's chest x-ray showed no acute cardiopulmonary process there is some linear scarring on official read at the lung base however that does not correlate with patient's symptoms.  Troponin was negative x 2 CBC showed no signs of leukocytosis anemia or thrombocytopenia CMP showed no acute findings. [ZS]      ED Course User Index  [ZS] Jodie Snell MD         Diagnoses as of 12/09/23 0036   Chest pain, unspecified type   Urinary tract infection without hematuria, site unspecified       Medical Decision Making      Procedure  Procedures     Jodie Snell MD  12/09/23 0036

## 2023-12-09 NOTE — DISCHARGE INSTRUCTIONS
You are found to have elevated blood pressure here in the emergency department I recommend that you follow-up with your primary care doctor soon as possible to have your blood pressure reevaluated and see if you require either a change in medication or be started on medication.     You have been evaluated in the Emergency Department for chest pain. There are many different causes of chest pain. Some of these causes could be serious, such as a heart attack or blood clot in the lungs, but many other causes are not life threatening, such as heartburn, viral infections, bruised bones/muscles, etc. On evaluation we did not find any emergent causes for your chest pain at this time.     Please return to Emergency Department or seek medical attention immediately if you have acute worsening in your chest pain or develop shortness of breath, repeated vomiting, fever, altered level of consciousness, coughing up blood, or start sweating and feel clammy.    If you were prescribed any medicine for home, please take as prescribed by your health-care provider. If you were given any follow-up appointments or numbers to call, please do so as instructed. Avoid any activities that bring on the chest pain. Also, avoid any tobacco products or excessive alcohol. Please ensure understanding of these instructions prior to discharge.    Please call your primary care physician and follow-up with them in the next 1 to 2 days.     You are also found to have urinary tract infection and I recommend that you follow-up with your doctor in the next 1 to 2 days.

## 2023-12-10 LAB — BACTERIA UR CULT: NORMAL

## 2023-12-11 ENCOUNTER — HOSPITAL ENCOUNTER (OUTPATIENT)
Dept: CARDIOLOGY | Facility: HOSPITAL | Age: 73
Discharge: HOME | End: 2023-12-11
Payer: MEDICARE

## 2023-12-11 PROCEDURE — 93005 ELECTROCARDIOGRAM TRACING: CPT

## 2023-12-13 ENCOUNTER — APPOINTMENT (OUTPATIENT)
Dept: PRIMARY CARE | Facility: CLINIC | Age: 73
End: 2023-12-13

## 2023-12-13 DIAGNOSIS — E78.5 HYPERLIPIDEMIA, UNSPECIFIED HYPERLIPIDEMIA TYPE: ICD-10-CM

## 2023-12-13 DIAGNOSIS — F41.9 ANXIETY: ICD-10-CM

## 2023-12-13 DIAGNOSIS — R07.9 CHEST PAIN, UNSPECIFIED TYPE: ICD-10-CM

## 2023-12-13 DIAGNOSIS — K21.9 GASTROESOPHAGEAL REFLUX DISEASE, UNSPECIFIED WHETHER ESOPHAGITIS PRESENT: ICD-10-CM

## 2023-12-13 DIAGNOSIS — R10.9 ABDOMINAL PAIN, UNSPECIFIED ABDOMINAL LOCATION: ICD-10-CM

## 2023-12-13 DIAGNOSIS — M81.0 OSTEOPOROSIS, POST-MENOPAUSAL: ICD-10-CM

## 2023-12-13 DIAGNOSIS — M54.50 LOW BACK PAIN WITHOUT SCIATICA, UNSPECIFIED BACK PAIN LATERALITY, UNSPECIFIED CHRONICITY: ICD-10-CM

## 2023-12-13 DIAGNOSIS — I10 BENIGN ESSENTIAL HTN: ICD-10-CM

## 2023-12-13 DIAGNOSIS — E03.9 HYPOTHYROIDISM, UNSPECIFIED TYPE: ICD-10-CM

## 2023-12-13 DIAGNOSIS — J45.909 MILD ASTHMA, UNSPECIFIED WHETHER COMPLICATED, UNSPECIFIED WHETHER PERSISTENT (HHS-HCC): ICD-10-CM

## 2023-12-13 DIAGNOSIS — E55.9 VITAMIN D DEFICIENCY: ICD-10-CM

## 2023-12-13 RX ORDER — ALBUTEROL SULFATE 90 UG/1
2 AEROSOL, METERED RESPIRATORY (INHALATION) EVERY 6 HOURS PRN
Qty: 18 G | Refills: 3 | Status: SHIPPED | OUTPATIENT
Start: 2023-12-13

## 2023-12-13 RX ORDER — OMEPRAZOLE 40 MG/1
40 CAPSULE, DELAYED RELEASE ORAL
Qty: 90 CAPSULE | Refills: 3 | Status: SHIPPED | OUTPATIENT
Start: 2023-12-13

## 2023-12-13 RX ORDER — MULTIVIT-MIN/IRON FUM/FOLIC AC 7.5 MG-4
1 TABLET ORAL DAILY
Qty: 90 TABLET | Refills: 3 | Status: SHIPPED | OUTPATIENT
Start: 2023-12-13

## 2023-12-13 RX ORDER — ATORVASTATIN CALCIUM 80 MG/1
80 TABLET, FILM COATED ORAL NIGHTLY
Qty: 90 TABLET | Refills: 3 | Status: SHIPPED | OUTPATIENT
Start: 2023-12-13 | End: 2024-01-18 | Stop reason: SDUPTHER

## 2023-12-13 RX ORDER — LOSARTAN POTASSIUM 50 MG/1
50 TABLET ORAL DAILY
Qty: 90 TABLET | Refills: 3 | Status: SHIPPED | OUTPATIENT
Start: 2023-12-13 | End: 2023-12-15 | Stop reason: SDUPTHER

## 2023-12-13 RX ORDER — METOPROLOL SUCCINATE 25 MG/1
25 TABLET, EXTENDED RELEASE ORAL DAILY
Qty: 90 TABLET | Refills: 3 | Status: SHIPPED | OUTPATIENT
Start: 2023-12-13 | End: 2024-01-18 | Stop reason: SDUPTHER

## 2023-12-13 RX ORDER — MONTELUKAST SODIUM 10 MG/1
10 TABLET ORAL NIGHTLY
Qty: 90 TABLET | Refills: 0 | Status: SHIPPED | OUTPATIENT
Start: 2023-12-13 | End: 2024-02-21

## 2023-12-13 RX ORDER — CALCIUM CARBONATE 600 MG
TABLET ORAL
Qty: 180 TABLET | Refills: 3 | Status: SHIPPED | OUTPATIENT
Start: 2023-12-13

## 2023-12-13 RX ORDER — ALENDRONATE SODIUM 70 MG/1
70 TABLET ORAL
Qty: 13 TABLET | Refills: 3 | Status: SHIPPED | OUTPATIENT
Start: 2023-12-13

## 2023-12-13 RX ORDER — BUSPIRONE HYDROCHLORIDE 5 MG/1
5 TABLET ORAL 2 TIMES DAILY
Qty: 180 TABLET | Refills: 3 | Status: SHIPPED | OUTPATIENT
Start: 2023-12-13

## 2023-12-13 RX ORDER — ERGOCALCIFEROL 1.25 MG/1
1.25 CAPSULE ORAL
Qty: 90 CAPSULE | Refills: 3 | Status: SHIPPED | OUTPATIENT
Start: 2023-12-13

## 2023-12-13 RX ORDER — LEVOTHYROXINE SODIUM 25 UG/1
25 TABLET ORAL
Qty: 90 TABLET | Refills: 0 | Status: SHIPPED | OUTPATIENT
Start: 2023-12-13 | End: 2024-02-21

## 2023-12-13 RX ORDER — LORATADINE 10 MG/1
10 TABLET ORAL DAILY
Qty: 90 TABLET | Refills: 3 | Status: SHIPPED | OUTPATIENT
Start: 2023-12-13

## 2023-12-13 RX ORDER — FLUTICASONE PROPIONATE 50 MCG
2 SPRAY, SUSPENSION (ML) NASAL DAILY
Qty: 16 G | Refills: 0 | Status: SHIPPED | OUTPATIENT
Start: 2023-12-13

## 2023-12-13 RX ORDER — LIDOCAINE 560 MG/1
1 PATCH PERCUTANEOUS; TOPICAL; TRANSDERMAL DAILY
Qty: 5 PATCH | Refills: 0 | Status: SHIPPED | OUTPATIENT
Start: 2023-12-13 | End: 2023-12-18

## 2023-12-13 RX ORDER — DICYCLOMINE HYDROCHLORIDE 20 MG/1
20 TABLET ORAL EVERY 8 HOURS PRN
Qty: 60 TABLET | Refills: 1 | Status: SHIPPED | OUTPATIENT
Start: 2023-12-13

## 2023-12-13 RX ORDER — ASPIRIN 81 MG/1
81 TABLET ORAL DAILY
Qty: 100 TABLET | Refills: 3 | Status: SHIPPED | OUTPATIENT
Start: 2023-12-13

## 2023-12-14 PROBLEM — F33.1 MAJOR DEPRESSIVE DISORDER, RECURRENT, MODERATE (MULTI): Status: ACTIVE | Noted: 2017-12-05

## 2023-12-14 PROBLEM — R29.6 REPEATED FALLS: Status: ACTIVE | Noted: 2017-12-05

## 2023-12-14 PROBLEM — M62.81 MUSCLE WEAKNESS (GENERALIZED): Status: ACTIVE | Noted: 2017-12-05

## 2023-12-15 ENCOUNTER — OFFICE VISIT (OUTPATIENT)
Dept: CARDIOLOGY | Facility: CLINIC | Age: 73
End: 2023-12-15
Payer: MEDICARE

## 2023-12-15 VITALS
HEIGHT: 58 IN | WEIGHT: 123.8 LBS | HEART RATE: 64 BPM | BODY MASS INDEX: 25.98 KG/M2 | SYSTOLIC BLOOD PRESSURE: 150 MMHG | OXYGEN SATURATION: 98 % | DIASTOLIC BLOOD PRESSURE: 70 MMHG

## 2023-12-15 DIAGNOSIS — E78.5 HYPERLIPIDEMIA, UNSPECIFIED HYPERLIPIDEMIA TYPE: ICD-10-CM

## 2023-12-15 DIAGNOSIS — Z09 HOSPITAL DISCHARGE FOLLOW-UP: ICD-10-CM

## 2023-12-15 DIAGNOSIS — I10 BENIGN ESSENTIAL HTN: ICD-10-CM

## 2023-12-15 DIAGNOSIS — R07.89 ATYPICAL CHEST PAIN: Primary | ICD-10-CM

## 2023-12-15 PROCEDURE — 1036F TOBACCO NON-USER: CPT | Performed by: INTERNAL MEDICINE

## 2023-12-15 PROCEDURE — 1125F AMNT PAIN NOTED PAIN PRSNT: CPT | Performed by: INTERNAL MEDICINE

## 2023-12-15 PROCEDURE — 1160F RVW MEDS BY RX/DR IN RCRD: CPT | Performed by: INTERNAL MEDICINE

## 2023-12-15 PROCEDURE — 99215 OFFICE O/P EST HI 40 MIN: CPT | Performed by: INTERNAL MEDICINE

## 2023-12-15 PROCEDURE — 3078F DIAST BP <80 MM HG: CPT | Performed by: INTERNAL MEDICINE

## 2023-12-15 PROCEDURE — 1159F MED LIST DOCD IN RCRD: CPT | Performed by: INTERNAL MEDICINE

## 2023-12-15 PROCEDURE — 3074F SYST BP LT 130 MM HG: CPT | Performed by: INTERNAL MEDICINE

## 2023-12-15 RX ORDER — LOSARTAN POTASSIUM 100 MG/1
100 TABLET ORAL DAILY
Qty: 90 TABLET | Refills: 3 | Status: SHIPPED | OUTPATIENT
Start: 2023-12-15 | End: 2024-04-15 | Stop reason: SDUPTHER

## 2023-12-15 NOTE — PROGRESS NOTES
"Referred by  No ref. provider found for NEW PT/ ER F/U     History Of Present Illness:    Halley Clements is a 73 y.o. female  with HTN/HLD/asthma but no other CV dz (NL 3/2021 stress test)presenting with CP.  To ED 12/8/23 presenting with \"pain all over\"  Activity limited-uses walker    Some SOB-uses inhaler  Denies dizziness/LH edema        Past Medical History:  She has a past medical history of Age-related osteoporosis without current pathological fracture (11/07/2013), Allergy, unspecified, initial encounter (11/07/2013), Cellulitis, unspecified (11/07/2013), Chronic frontal sinusitis (11/07/2013), Conjunctival hemorrhage, unspecified eye (11/07/2013), Contusion of unspecified thigh, initial encounter (11/07/2013), Depression, unspecified (11/07/2013), Disorder of the skin and subcutaneous tissue, unspecified (11/07/2013), Encounter for immunization (11/07/2013), Encounter for screening for malignant neoplasm of colon, Epistaxis (11/07/2013), Erythema intertrigo (11/07/2013), Flushing (11/07/2013), Furuncle of perineum (11/07/2013), Gastritis, unspecified, without bleeding (11/07/2013), Herpesviral infection, unspecified (11/07/2013), Noninfective gastroenteritis and colitis, unspecified (11/07/2013), Other allergy status, other than to drugs and biological substances, Other conditions influencing health status (11/07/2013), Other conditions influencing health status (11/07/2013), Other fatigue (11/07/2013), Other specified disorders of nose and nasal sinuses (10/19/2020), Other vitreous opacities, unspecified eye (11/07/2013), Otitis media, unspecified, unspecified ear (11/07/2013), Pain in unspecified hip (11/07/2013), Person consulting for explanation of examination or test findings, Personal history of other diseases of the circulatory system, Personal history of other diseases of the female genital tract (10/04/2017), Personal history of other diseases of the female genital tract (02/26/2016), Personal " history of other diseases of the musculoskeletal system and connective tissue (11/07/2013), Personal history of other diseases of the respiratory system (09/28/2022), Personal history of other diseases of the respiratory system (04/14/2015), Personal history of other medical treatment, Personal history of other specified conditions, Pneumonia, unspecified organism (11/07/2013), Postmenopausal bleeding (11/07/2013), Tinea pedis (11/07/2013), Unspecified asthma, uncomplicated (11/07/2013), Unspecified blepharitis unspecified eye, unspecified eyelid (11/07/2013), Unspecified hemorrhoids (11/07/2013), Unspecified mental disorder due to known physiological condition (09/20/2017), Unspecified nonsuppurative otitis media, unspecified ear (11/07/2013), Unspecified open wound of unspecified hand, initial encounter (11/07/2013), Unspecified osteoarthritis, unspecified site (07/02/2014), and Wedge compression fracture of T11-T12 vertebra, initial encounter for closed fracture (CMS/[facility]) (12/01/2021).    Past Surgical History:  She has a past surgical history that includes Other surgical history (03/03/2014); Other surgical history (07/18/2018); Tubal ligation (10/04/2017); Other surgical history (11/07/2022); Hysterectomy (02/26/2016); and Other surgical history (02/26/2016).      Social History:  She reports that she has never smoked. She has never used smokeless tobacco. No history on file for alcohol use and drug use.    Family History:  Family History   Problem Relation Name Age of Onset    Cancer Mother          Allergies:  Pneumovax-23 [pneumococcal 23-raquel ps vaccine] and Sulfa (sulfonamide antibiotics)    Outpatient Medications:  Current Outpatient Medications   Medication Instructions    albuterol 90 mcg/actuation inhaler 2 puffs, inhalation, Every 6 hours PRN, EVERY 4-6 HOURS    alendronate (FOSAMAX) 70 mg, oral, Every 7 days, Take in the morning with a full glass of water, on an empty stomach, and do not take anything  "else by mouth or lie down for the next 30 min.    aspirin 81 mg, oral, Daily    atorvastatin (LIPITOR) 80 mg, oral, Nightly    busPIRone (BUSPAR) 5 mg, oral, 2 times daily    calcium carbonate 600 mg calcium (1,500 mg) tablet Take 1 tablet (600 mg) by mouth 2 times a day.    cephalexin (KEFLEX) 500 mg, oral, 2 times daily    dicyclomine (BENTYL) 20 mg, oral, Every 8 hours PRN    ergocalciferol (VITAMIN D-2) 1,250 mcg, oral, Every 30 days    fluticasone (Flonase) 50 mcg/actuation nasal spray 2 sprays, Each Nostril, Daily    levothyroxine (SYNTHROID, LEVOXYL) 25 mcg, oral, Daily before breakfast, 30 MINS PRIOR TO BREAKFAST    lidocaine 4 % patch 1 patch, transdermal, Daily, Remove & discard patch within 12 hours or as directed by MD.    loratadine (CLARITIN) 10 mg, oral, Daily    losartan (COZAAR) 50 mg, oral, Daily    metoprolol succinate XL (TOPROL-XL) 25 mg, oral, Daily    montelukast (SINGULAIR) 10 mg, oral, Nightly    multivitamin with minerals (multivit-min-iron fum-folic ac) tablet 1 tablet, oral, Daily    omeprazole (PRILOSEC) 40 mg, oral, Daily before breakfast        Last Recorded Vitals:  Vitals:    12/15/23 1354   BP: 120/70   Pulse: 64   SpO2: 98%   Weight: 56.2 kg (123 lb 12.8 oz)   Height: 1.473 m (4' 10\")       Physical Exam:  Constitutional:       General: Awake.      Appearance: Overweight and not in distress. Frail. Chronically ill-appearing.   Neck:      Vascular: No JVR. JVD normal.   Pulmonary:      Effort: Pulmonary effort is normal.      Breath sounds: Normal breath sounds. No wheezing. No rhonchi. No rales.   Chest:      Chest wall: Not tender to palpatation.   Cardiovascular:      PMI at left midclavicular line. Normal rate. Regular rhythm. Normal S1. Normal S2.       Murmurs: There is no murmur.      No gallop.  No click. No rub.   Pulses:     Intact distal pulses.   Edema:     Peripheral edema absent.   Abdominal:      General: Bowel sounds are normal.      Palpations: Abdomen is soft.      " "Tenderness: There is no abdominal tenderness.   Musculoskeletal: Normal range of motion.         General: No tenderness.      Comments: Walks with walker Skin:     General: Skin is warm and dry.   Neurological:      General: No focal deficit present.      Mental Status: Alert and oriented to person, place and time.            Last Labs:  CBC -  Lab Results   Component Value Date    WBC 6.0 12/08/2023    HGB 13.1 12/08/2023    HCT 41.7 12/08/2023    MCV 94 12/08/2023     12/08/2023       CMP -  Lab Results   Component Value Date    CALCIUM 9.1 12/08/2023    PROT 6.6 12/08/2023    ALBUMIN 4.0 12/08/2023    AST 25 12/08/2023    ALT 13 12/08/2023    ALKPHOS 87 12/08/2023    BILITOT 0.2 12/08/2023       LIPID PANEL -   Lab Results   Component Value Date    CHOL 105 10/13/2023    TRIG 296 (H) 09/03/2023    HDL 40.9 10/13/2023    CHHDL 2.6 10/13/2023    LDLF 90 09/03/2023    VLDL 59 (H) 09/03/2023    NHDL 149 09/03/2023       RENAL FUNCTION PANEL -   Lab Results   Component Value Date    GLUCOSE 127 (H) 12/08/2023     12/08/2023    K 4.1 12/08/2023     (H) 12/08/2023    CO2 19 (L) 12/08/2023    ANIONGAP 18 12/08/2023    BUN 13 12/08/2023    CREATININE 0.57 12/08/2023    CALCIUM 9.1 12/08/2023    ALBUMIN 4.0 12/08/2023        Lab Results   Component Value Date     (H) 09/03/2023       Last Cardiology Tests:  ECG:  ECG 12 lead 12/11/2023 (Preliminary)  NSR  Echo:  No results found for this or any previous visit from the past 1095 days.  9/2023  Ejection Fractions:  No results found for: \"EF\"  Ef=70-75%  Cath:  No results found for this or any previous visit from the past 1095 days.      Stress Test:  Nuclear Stress Test 03/01/2021    NL  Cardiac Imaging:  No results found for this or any previous visit from the past 1095 days.        Lab review: I have personally reviewed the laboratory result(s)     Assessment/Plan   Problem List Items Addressed This Visit       Atypical chest pain - Primary    " Overview     3/2021 stress test  9/2023 hospital W/U negative -NL echo at that time  12/2023 ED W/U negative  Doubt cardiac etiology-no additional cardiac testing now         Benign essential HTN    Overview     BP not optimal  Increase losartan to 100 mg daily  12/2023 BMP OK         Hyperlipidemia    Overview     On HI statin  Follow HH diet         Hospital discharge follow-up    Overview     Records reviewed           Increase losartan to 100 mg daily for BP-can take two 50 mg tablets      Jarvis Martinez, DO

## 2023-12-18 ENCOUNTER — TELEPHONE (OUTPATIENT)
Dept: PRIMARY CARE | Facility: CLINIC | Age: 73
End: 2023-12-18

## 2023-12-18 NOTE — TELEPHONE ENCOUNTER
Pharmacy calling in stating that there were 2 directions on patients albuterol HFA inhaler (Inhale 2 puffs every 6 hours if needed for wheezing+ every 4-6 hours).     Pharmacy wanting to clarify the time interval of usage for insurance purposes- is patient to use every 6 hours or every 4-6 hours. Please advise, thank you.

## 2023-12-18 NOTE — TELEPHONE ENCOUNTER
Returned phone call to [facility] [employer] and notified pharmacy technician to update rx per  q 6 hours PRN.

## 2024-01-08 LAB
ATRIAL RATE: 66 BPM
P AXIS: 68 DEGREES
P OFFSET: 191 MS
P ONSET: 150 MS
PR INTERVAL: 140 MS
Q ONSET: 220 MS
QRS COUNT: 11 BEATS
QRS DURATION: 82 MS
QT INTERVAL: 416 MS
QTC CALCULATION(BAZETT): 436 MS
QTC FREDERICIA: 429 MS
R AXIS: 40 DEGREES
T AXIS: 45 DEGREES
T OFFSET: 428 MS
VENTRICULAR RATE: 66 BPM

## 2024-01-18 DIAGNOSIS — I10 BENIGN ESSENTIAL HTN: ICD-10-CM

## 2024-01-18 DIAGNOSIS — E78.5 HYPERLIPIDEMIA, UNSPECIFIED HYPERLIPIDEMIA TYPE: ICD-10-CM

## 2024-01-18 RX ORDER — ATORVASTATIN CALCIUM 80 MG/1
80 TABLET, FILM COATED ORAL NIGHTLY
Qty: 90 TABLET | Refills: 3 | Status: SHIPPED | OUTPATIENT
Start: 2024-01-18 | End: 2024-02-08 | Stop reason: SDUPTHER

## 2024-01-18 RX ORDER — METOPROLOL SUCCINATE 25 MG/1
25 TABLET, EXTENDED RELEASE ORAL DAILY
Qty: 90 TABLET | Refills: 3 | Status: SHIPPED | OUTPATIENT
Start: 2024-01-18 | End: 2024-02-08 | Stop reason: SDUPTHER

## 2024-02-08 ENCOUNTER — OFFICE VISIT (OUTPATIENT)
Dept: PRIMARY CARE | Facility: CLINIC | Age: 74
End: 2024-02-08
Payer: MEDICARE

## 2024-02-08 VITALS
TEMPERATURE: 97.9 F | SYSTOLIC BLOOD PRESSURE: 126 MMHG | WEIGHT: 122 LBS | HEART RATE: 123 BPM | OXYGEN SATURATION: 98 % | DIASTOLIC BLOOD PRESSURE: 84 MMHG | BODY MASS INDEX: 25.5 KG/M2

## 2024-02-08 DIAGNOSIS — F09 COGNITIVE DYSFUNCTION: Primary | ICD-10-CM

## 2024-02-08 DIAGNOSIS — B00.1 COLD SORE: ICD-10-CM

## 2024-02-08 DIAGNOSIS — E78.5 HYPERLIPIDEMIA, UNSPECIFIED HYPERLIPIDEMIA TYPE: ICD-10-CM

## 2024-02-08 DIAGNOSIS — R23.2 HOT FLASHES: ICD-10-CM

## 2024-02-08 DIAGNOSIS — F33.1 MAJOR DEPRESSIVE DISORDER, RECURRENT, MODERATE (MULTI): ICD-10-CM

## 2024-02-08 DIAGNOSIS — F41.9 ANXIETY: ICD-10-CM

## 2024-02-08 DIAGNOSIS — I10 BENIGN ESSENTIAL HTN: ICD-10-CM

## 2024-02-08 PROBLEM — E66.01 OBESITY, MORBID (MULTI): Status: RESOLVED | Noted: 2023-05-24 | Resolved: 2024-02-08

## 2024-02-08 PROCEDURE — 1125F AMNT PAIN NOTED PAIN PRSNT: CPT | Performed by: INTERNAL MEDICINE

## 2024-02-08 PROCEDURE — 1036F TOBACCO NON-USER: CPT | Performed by: INTERNAL MEDICINE

## 2024-02-08 PROCEDURE — 3079F DIAST BP 80-89 MM HG: CPT | Performed by: INTERNAL MEDICINE

## 2024-02-08 PROCEDURE — 1157F ADVNC CARE PLAN IN RCRD: CPT | Performed by: INTERNAL MEDICINE

## 2024-02-08 PROCEDURE — 3074F SYST BP LT 130 MM HG: CPT | Performed by: INTERNAL MEDICINE

## 2024-02-08 PROCEDURE — 99214 OFFICE O/P EST MOD 30 MIN: CPT | Performed by: INTERNAL MEDICINE

## 2024-02-08 PROCEDURE — 1159F MED LIST DOCD IN RCRD: CPT | Performed by: INTERNAL MEDICINE

## 2024-02-08 RX ORDER — METOPROLOL SUCCINATE 25 MG/1
25 TABLET, EXTENDED RELEASE ORAL DAILY
Qty: 90 TABLET | Refills: 3 | Status: SHIPPED | OUTPATIENT
Start: 2024-02-08 | End: 2024-02-08 | Stop reason: SDUPTHER

## 2024-02-08 RX ORDER — ATORVASTATIN CALCIUM 80 MG/1
80 TABLET, FILM COATED ORAL NIGHTLY
Qty: 90 TABLET | Refills: 3 | Status: SHIPPED | OUTPATIENT
Start: 2024-02-08 | End: 2024-04-02 | Stop reason: SDUPTHER

## 2024-02-08 RX ORDER — METOPROLOL SUCCINATE 25 MG/1
25 TABLET, EXTENDED RELEASE ORAL DAILY
Qty: 90 TABLET | Refills: 3 | Status: SHIPPED | OUTPATIENT
Start: 2024-02-08

## 2024-02-08 RX ORDER — ATORVASTATIN CALCIUM 80 MG/1
80 TABLET, FILM COATED ORAL NIGHTLY
Qty: 90 TABLET | Refills: 3 | Status: SHIPPED | OUTPATIENT
Start: 2024-02-08 | End: 2024-02-08 | Stop reason: SDUPTHER

## 2024-02-08 NOTE — PATIENT INSTRUCTIONS
Plan   Will try magnesium pill to see if that helps with hot flash   Herpecin L for cold sore .   Current medications are effective. advised to continue current medications.  Patient  was advised to call back if symptoms persist after few days or worsen.   Patient agreed with plan and felt satisfied.  Follow up in 6  months as scheduled

## 2024-02-08 NOTE — PROGRESS NOTES
My nurse note reviewed. Patient is here for:  Follow-up (Would like to get treated for rash and hot flashes/Left arm pain )       Gets occ hot flash . Has cold sore off and on .     Patient denies any shortness of breath, PND, orthopnea, chest pain , palpitation, syncope or edema in legs    Patient denies any weakness in extremities.. Denies any headache, visual symptoms , speech problems or  tremors . No TIA or stroke like symptoms..    OBJECTIVE :  /84   Pulse (!) 123   Temp 36.6 °C (97.9 °F)   Wt 55.3 kg (122 lb)   SpO2 98%   BMI 25.50 kg/m²   CVS: S1 S2 + , no S3. No loud heart murmur appreciated. Lungs clear, No edema  Low IQ as before .   No cold sore at persent . No sweatiness    Assessment:  1. Cognitive dysfunction  Hx of. Has low IQ      2. Hyperlipidemia, unspecified hyperlipidemia type  atorvastatin (Lipitor) 80 mg tablet    DISCONTINUED: atorvastatin (Lipitor) 80 mg tablet      3. Benign essential HTN -controlled   metoprolol succinate XL (Toprol-XL) 25 mg 24 hr tablet    DISCONTINUED: metoprolol succinate XL (Toprol-XL) 25 mg 24 hr tablet      4. Major depressive disorder, recurrent, moderate (CMS/HCC)  Hx of. On med.       5. Anxiety        6. Hot flashes  Will try otc magnesium as she wanted to try it.       7. Cold sore  Otc management         Plan   Will try magnesium pill to see if that helps with hot flash -Magnesium oxide 200 mg twice a day , OTC>   Herpecin L for cold sore, OTC.   Current medications are effective. advised to continue current medications.  Patient  was advised to call back if symptoms persist after few days or worsen.   Patient agreed with plan and felt satisfied.  Follow up in 6  months as scheduled

## 2024-02-19 ENCOUNTER — HOSPITAL ENCOUNTER (EMERGENCY)
Facility: HOSPITAL | Age: 74
Discharge: HOME | End: 2024-02-19
Payer: MEDICARE

## 2024-02-19 VITALS
TEMPERATURE: 98.1 F | OXYGEN SATURATION: 96 % | DIASTOLIC BLOOD PRESSURE: 68 MMHG | BODY MASS INDEX: 25.48 KG/M2 | RESPIRATION RATE: 16 BRPM | SYSTOLIC BLOOD PRESSURE: 144 MMHG | HEART RATE: 76 BPM | WEIGHT: 121.91 LBS

## 2024-02-19 DIAGNOSIS — J01.91 ACUTE RECURRENT SINUSITIS, UNSPECIFIED LOCATION: Primary | ICD-10-CM

## 2024-02-19 PROCEDURE — 99283 EMERGENCY DEPT VISIT LOW MDM: CPT

## 2024-02-19 RX ORDER — DOXYCYCLINE 100 MG/1
100 TABLET ORAL 2 TIMES DAILY
Qty: 14 TABLET | Refills: 0 | Status: SHIPPED | OUTPATIENT
Start: 2024-02-19 | End: 2024-02-26

## 2024-02-20 NOTE — ED PROVIDER NOTES
Emergency Department Encounter  [facility] EMERGENCY MEDICINE    Patient: Halley Clements  MRN: [id number]  : 1950  Date of Evaluation: 2024  ED Provider: LA Spencer      Chief Complaint       Chief Complaint   Patient presents with    Sinusitis     Patient arrives by EMS from home reporting concern for sinus infection. Reports her daughter visited her and is sick with similar symptoms. Endorses green nasal drainage. She also reports atraumatic left shoulder pain that has been bothering her while trying to sleep the last few nights. ROM intact     [place]    (Location/Symptom, Timing/Onset, Context/Setting, Quality, Duration, Modifying Factors, Severity) Note limiting factors.   Limitations to History: none  Historian: self  Records reviewed: EMR inpatient and outpatient notes, Care Everywhere      Halley Clements is a 73 y.o. female who presents to the emergency department complaining of sinus infection, sinus pressure, green purulent drainage coming from her nose, states her symptoms started on Friday when her daughter visited with upper respiratory symptoms and got progressively worse today, states that she had similar symptoms a few weeks ago and was on antibiotics and they went away.  Has an appointment with her doctor on Friday but states that she could not wait.  Denies any chest pain, shortness of breath, abdominal pain, nausea, vomiting.    ROS:     Review of Systems  14 systems reviewed and otherwise acutely negative except as in the [place].          Past History     Past Medical History:   Diagnosis Date    Age-related osteoporosis without current pathological fracture 2013    Osteoporosis    Allergy, unspecified, initial encounter 2013    Allergic reaction    Cellulitis, unspecified 2013    Cellulitis    Chronic frontal sinusitis 2013    Chronic frontal sinusitis    Conjunctival hemorrhage, unspecified eye 2013    Subconjunctival hemorrhage     Contusion of unspecified thigh, initial encounter 11/07/2013    Contusion of thigh    Depression, unspecified 11/07/2013    Depression    Disorder of the skin and subcutaneous tissue, unspecified 11/07/2013    Skin disorder    Encounter for immunization 11/07/2013    Need for DTP vaccine    Encounter for screening for malignant neoplasm of colon     Encounter for screening colonoscopy    Epistaxis 11/07/2013    Epistaxis    Erythema intertrigo 11/07/2013    Intertrigo    Flushing 11/07/2013    Flushing    Furuncle of perineum 11/07/2013    Furuncle of perineum    Gastritis, unspecified, without bleeding 11/07/2013    Gastritis    Herpesviral infection, unspecified 11/07/2013    Herpes simplex    Noninfective gastroenteritis and colitis, unspecified 11/07/2013    Noninfectious gastroenteritis    Other allergy status, other than to drugs and biological substances     History of environmental allergies    Other conditions influencing health status 11/07/2013    A Fall    Other conditions influencing health status 11/07/2013    Rodent Bite    Other fatigue 11/07/2013    Fatigue    Other specified disorders of nose and nasal sinuses 10/19/2020    Nasal valve stenosis    Other vitreous opacities, unspecified eye 11/07/2013    Vitreous floaters    Otitis media, unspecified, unspecified ear 11/07/2013    Otitis media    Pain in unspecified hip 11/07/2013    Joint pain, hip    Person consulting for explanation of examination or test findings     Visit for review of DEXA scan    Personal history of other diseases of the circulatory system     History of hypertension    Personal history of other diseases of the female genital tract 10/04/2017    History of postmenopausal bleeding    Personal history of other diseases of the female genital tract 02/26/2016    History of postmenopausal bleeding    Personal history of other diseases of the musculoskeletal system and connective tissue 11/07/2013    History of backache     Personal history of other diseases of the respiratory system 09/28/2022    History of deviated nasal septum    Personal history of other diseases of the respiratory system 04/14/2015    History of acute sinusitis    Personal history of other medical treatment     History of mammogram    Personal history of other specified conditions     History of snoring    Pneumonia, unspecified organism 11/07/2013    Pneumonitis    Postmenopausal bleeding 11/07/2013    Postmenopausal bleeding    Tinea pedis 11/07/2013    Tinea pedis    Unspecified asthma, uncomplicated 11/07/2013    Asthma    Unspecified blepharitis unspecified eye, unspecified eyelid 11/07/2013    Blepharitis    Unspecified hemorrhoids 11/07/2013    Hemorrhoids    Unspecified mental disorder due to known physiological condition 09/20/2017    Cognitive dysfunction    Unspecified nonsuppurative otitis media, unspecified ear 11/07/2013    Nonsuppurative otitis media    Unspecified open wound of unspecified hand, initial encounter 11/07/2013    Open wound of hand    Unspecified osteoarthritis, unspecified site 07/02/2014    Osteoarthrosis, unspecified whether generalized or localized, other specified sites    Wedge compression fracture of T11-T12 vertebra, initial encounter for closed fracture (CMS/HCC) 12/01/2021    Wedge compression fracture of T11-T12 vertebra, initial encounter for closed fracture     Past Surgical History:   Procedure Laterality Date    HYSTERECTOMY  02/26/2016    Hysterectomy    OTHER SURGICAL HISTORY  03/03/2014    Vaccines Prophylactic Need Against Bacterial Diseases    OTHER SURGICAL HISTORY  07/18/2018    Reported Hx Of Hip Replacement - Bilateral    OTHER SURGICAL HISTORY  11/07/2022    Arm surgery    OTHER SURGICAL HISTORY  02/26/2016    Brain Surgery    TUBAL LIGATION  10/04/2017    Tubal Ligation     Social History     Socioeconomic History    Marital status:      Spouse name: Not on file    Number of children: Not on file     Years of education: Not on file    Highest education level: Not on file   Occupational History    Not on file   Tobacco Use    Smoking status: Never    Smokeless tobacco: Never   Substance and Sexual Activity    Alcohol use: Not on file    Drug use: Not on file    Sexual activity: Not on file   Other Topics Concern    Not on file   Social History Narrative    Not on file     Social Determinants of Health     Financial Resource Strain: Not on file   Food Insecurity: Not on file   Transportation Needs: Not on file   Physical Activity: Not on file   Stress: Not on file   Social Connections: Not on file   Intimate Partner Violence: Not on file   Housing Stability: Not on file       Medications/Allergies     Previous Medications    ALBUTEROL 90 MCG/ACTUATION INHALER    Inhale 2 puffs every 6 hours if needed for wheezing. EVERY 4-6 HOURS    ALENDRONATE (FOSAMAX) 70 MG TABLET    Take 1 tablet (70 mg) by mouth every 7 days. Take in the morning with a full glass of water, on an empty stomach, and do not take anything else by mouth or lie down for the next 30 min.    ASPIRIN 81 MG EC TABLET    Take 1 tablet (81 mg) by mouth once daily.    ATORVASTATIN (LIPITOR) 80 MG TABLET    Take 1 tablet (80 mg) by mouth once daily at bedtime.    BUSPIRONE (BUSPAR) 5 MG TABLET    Take 1 tablet (5 mg) by mouth 2 times a day.    CALCIUM CARBONATE 600 MG CALCIUM (1,500 MG) TABLET    Take 1 tablet (600 mg) by mouth 2 times a day.    DICYCLOMINE (BENTYL) 20 MG TABLET    Take 1 tablet (20 mg) by mouth every 8 hours if needed (as needed).    ERGOCALCIFEROL (VITAMIN D-2) 1.25 MG (31888 UT) CAPSULE    Take 1 capsule (1,250 mcg) by mouth every 30 (thirty) days.    FLUTICASONE (FLONASE) 50 MCG/ACTUATION NASAL SPRAY    Administer 2 sprays into each nostril once daily.    LEVOTHYROXINE (SYNTHROID, LEVOXYL) 25 MCG TABLET    Take 1 tablet (25 mcg) by mouth once daily in the morning. Take before meals. 30 MINS PRIOR TO BREAKFAST    LORATADINE (CLARITIN)  10 MG TABLET    Take 1 tablet (10 mg) by mouth once daily.    LOSARTAN (COZAAR) 100 MG TABLET    Take 1 tablet (100 mg) by mouth once daily.    METOPROLOL SUCCINATE XL (TOPROL-XL) 25 MG 24 HR TABLET    Take 1 tablet (25 mg) by mouth once daily.    MONTELUKAST (SINGULAIR) 10 MG TABLET    Take 1 tablet (10 mg) by mouth once daily at bedtime.    MULTIVITAMIN WITH MINERALS (MULTIVIT-MIN-IRON FUM-FOLIC AC) TABLET    Take 1 tablet by mouth once daily.    OMEPRAZOLE (PRILOSEC) 40 MG DR CAPSULE    Take 1 capsule (40 mg) by mouth once daily in the morning. Take before meals.     Allergies   Allergen Reactions    Pneumovax-23 [Pneumococcal 23-Wendi Ps Vaccine] Unknown    Sulfa (Sulfonamide Antibiotics) Unknown        Physical Exam       ED Triage Vitals [02/19/24 2009]   Temperature Heart Rate Respirations BP   36.7 °C (98.1 °F) 93 16 180/78      Pulse Ox Temp src Heart Rate Source Patient Position   97 % -- -- --      BP Location FiO2 (%)     -- --         Physical Exam    GENERAL:  The patient appears nourished and normally developed. Vital signs as documented.     HEENT:  Head normocephalic, atraumatic, EOMs intact, PERRLA, Mucous membranes moist. Nares patent without copious rhinorrhea.  No lymphadenopathy.  No frontal or maxillary sinus tenderness    PULMONARY:  Lungs are clear to auscultation, without any respiratory distress. Able to speak full sentences, no accessory muscle use    CARDIAC:   Normal rate. No murmurs, rubs or gallops    ABDOMEN:  Soft, non distended, non tender, BS positive x 4 quadrants, No rebound or guarding, no peritoneal signs, no CVA tenderness, no masses or organomegaly      MUSCULOSKELETAL:   Able to ambulate, Non edematous, with no obvious deformities. Pulses intact distal    SKIN:   Good color, with no significant rashes.  No pallor.    NEURO:  No obvious neurological deficits, normal sensation and strength bilaterally.  Able to follow commands, NIH 0, CN 2-12 intact.        Diagnostics  "  Labs:  Labs Reviewed - No data to display  Radiographs:  No orders to display             Assessment   In brief, Halley Clements is a 73 y.o. female who presented to the emergency department with sinus congestion and green purulent drainage that started Friday getting progressively worse today after sick contact at home    Plan   Antibiotics    Differentials   Sinusitis  Viral infection  Upper respiratory infection    ED Course     Diagnoses as of 02/19/24 2116   Acute recurrent sinusitis, unspecified location       Visit Vitals  /78   Pulse 93   Temp 36.7 °C (98.1 °F)   Resp 16   Wt 55.3 kg (121 lb 14.6 oz)   SpO2 97%   BMI 25.48 kg/m²   Smoking Status Never   BSA 1.5 m²       Medications - No data to display    Plan of care discussed, patient is stable appearing, in no distress, was also discussing persistent left arm pain that is worse at night after breaking her arm multiple years ago and states that she takes a painkiller for this, has full functionality of her left arm with no associated chest pain and states that this has been ongoing for years.  Also complaining of knee pain that is also been ongoing for years and states \"my knee may give out sometime due to the pain\".  But states that it has not given out yet.  States her main complaint today is the sinus infection and would like antibiotics, states that she was on antibiotics 2 weeks ago for similar symptoms and states that she got better, discussed with patient that this may be viral, patient also endorsed that she lost her voice this morning but then it came back.  Also discussed with patient that antibiotics may not help alleviate her symptoms if they are viral however patient is still adamant about receiving antibiotic treatment.  Was placed on doxycycline and discharged home in stable condition, educated on any worsening signs and symptoms to return to the emergency department      Final Impression      1. Acute recurrent sinusitis, unspecified " location          DISPOSITION  Disposition: Discharge  Patient condition is: Stable    Comment: Please note this report has been produced using speech recognition software and may contain errors related to that system including errors in grammar, punctuation, and spelling, as well as words and phrases that may be inappropriate.  If there are any questions or concerns please feel free to contact the dictating provider for clarification.    LA Spencer APRN-CNP  02/19/24 4591

## 2024-02-21 DIAGNOSIS — J45.909 MILD ASTHMA, UNSPECIFIED WHETHER COMPLICATED, UNSPECIFIED WHETHER PERSISTENT (HHS-HCC): ICD-10-CM

## 2024-02-21 DIAGNOSIS — E03.9 HYPOTHYROIDISM, UNSPECIFIED TYPE: ICD-10-CM

## 2024-02-21 RX ORDER — LEVOTHYROXINE SODIUM 25 UG/1
TABLET ORAL
Qty: 90 TABLET | Refills: 0 | Status: SHIPPED | OUTPATIENT
Start: 2024-02-21 | End: 2024-05-08

## 2024-02-21 RX ORDER — MONTELUKAST SODIUM 10 MG/1
10 TABLET ORAL NIGHTLY
Qty: 90 TABLET | Refills: 0 | Status: SHIPPED | OUTPATIENT
Start: 2024-02-21 | End: 2024-05-08

## 2024-03-08 ENCOUNTER — APPOINTMENT (OUTPATIENT)
Dept: PRIMARY CARE | Facility: CLINIC | Age: 74
End: 2024-03-08
Payer: MEDICARE

## 2024-03-11 ENCOUNTER — OFFICE VISIT (OUTPATIENT)
Dept: PRIMARY CARE | Facility: CLINIC | Age: 74
End: 2024-03-11
Payer: MEDICARE

## 2024-03-11 VITALS
SYSTOLIC BLOOD PRESSURE: 114 MMHG | BODY MASS INDEX: 27.21 KG/M2 | WEIGHT: 130.2 LBS | RESPIRATION RATE: 18 BRPM | OXYGEN SATURATION: 97 % | HEART RATE: 58 BPM | TEMPERATURE: 96.3 F | DIASTOLIC BLOOD PRESSURE: 69 MMHG

## 2024-03-11 DIAGNOSIS — J02.9 SORE THROAT: Primary | ICD-10-CM

## 2024-03-11 PROCEDURE — 1157F ADVNC CARE PLAN IN RCRD: CPT | Performed by: FAMILY MEDICINE

## 2024-03-11 PROCEDURE — 99213 OFFICE O/P EST LOW 20 MIN: CPT | Performed by: FAMILY MEDICINE

## 2024-03-11 PROCEDURE — 3074F SYST BP LT 130 MM HG: CPT | Performed by: FAMILY MEDICINE

## 2024-03-11 PROCEDURE — 1036F TOBACCO NON-USER: CPT | Performed by: FAMILY MEDICINE

## 2024-03-11 PROCEDURE — 1126F AMNT PAIN NOTED NONE PRSNT: CPT | Performed by: FAMILY MEDICINE

## 2024-03-11 PROCEDURE — 1159F MED LIST DOCD IN RCRD: CPT | Performed by: FAMILY MEDICINE

## 2024-03-11 PROCEDURE — 3078F DIAST BP <80 MM HG: CPT | Performed by: FAMILY MEDICINE

## 2024-03-11 RX ORDER — DEXAMETHASONE 4 MG/1
4 TABLET ORAL 2 TIMES DAILY
Qty: 2 TABLET | Refills: 0 | Status: SHIPPED | OUTPATIENT
Start: 2024-03-11 | End: 2024-03-12

## 2024-03-11 ASSESSMENT — PAIN SCALES - GENERAL: PAINLEVEL: 0-NO PAIN

## 2024-03-11 NOTE — PROGRESS NOTES
Subjective   Patient ID: Halley Clements is a 73 y.o. female who presents for Sick Visit.    HPI   Runny nose : green to clear nasal discharge.  Postnasal drip    Review of Systems   All other systems reviewed and are negative.      Objective   /69 (BP Location: Left arm, Patient Position: Sitting, BP Cuff Size: Adult)   Pulse 58   Temp 35.7 °C (96.3 °F) (Temporal)   Resp 18   Wt 59.1 kg (130 lb 3.2 oz)   LMP  (LMP Unknown)   SpO2 97%   BMI 27.21 kg/m²     Physical Exam  Vitals and nursing note reviewed.   HENT:      Nose: Rhinorrhea present.      Mouth/Throat:      Pharynx: Posterior oropharyngeal erythema present.   Cardiovascular:      Rate and Rhythm: Normal rate and regular rhythm.   Pulmonary:      Effort: Pulmonary effort is normal.      Breath sounds: Rhonchi present.   Musculoskeletal:      Cervical back: Neck supple.   Lymphadenopathy:      Cervical: No cervical adenopathy.   Neurological:      Mental Status: She is alert.         Assessment/Plan   Diagnoses and all orders for this visit:  Sore throat  -     dexAMETHasone (Decadron) 4 mg tablet; Take 1 tablet (4 mg) by mouth 2 times a day for 1 day.

## 2024-03-15 ENCOUNTER — TELEPHONE (OUTPATIENT)
Dept: PRIMARY CARE | Facility: CLINIC | Age: 74
End: 2024-03-15
Payer: MEDICARE

## 2024-03-15 DIAGNOSIS — S83.91XA SPRAIN OF RIGHT KNEE, UNSPECIFIED LIGAMENT, INITIAL ENCOUNTER: Primary | ICD-10-CM

## 2024-03-15 NOTE — TELEPHONE ENCOUNTER
Patient stated that this right knee problem and pain started about a week ago with pain swelling and at times it wants to give out ,

## 2024-03-18 ENCOUNTER — HOSPITAL ENCOUNTER (OUTPATIENT)
Dept: RADIOLOGY | Facility: HOSPITAL | Age: 74
Discharge: HOME | End: 2024-03-18
Payer: MEDICARE

## 2024-03-18 DIAGNOSIS — S83.91XA SPRAIN OF RIGHT KNEE, UNSPECIFIED LIGAMENT, INITIAL ENCOUNTER: ICD-10-CM

## 2024-03-18 PROCEDURE — 73562 X-RAY EXAM OF KNEE 3: CPT | Mod: RIGHT SIDE | Performed by: RADIOLOGY

## 2024-03-18 PROCEDURE — 73562 X-RAY EXAM OF KNEE 3: CPT | Mod: RT

## 2024-03-28 ENCOUNTER — APPOINTMENT (OUTPATIENT)
Dept: PRIMARY CARE | Facility: CLINIC | Age: 74
End: 2024-03-28
Payer: MEDICARE

## 2024-04-02 ENCOUNTER — OFFICE VISIT (OUTPATIENT)
Dept: PRIMARY CARE | Facility: CLINIC | Age: 74
End: 2024-04-02
Payer: MEDICARE

## 2024-04-02 VITALS
WEIGHT: 130 LBS | BODY MASS INDEX: 27.29 KG/M2 | SYSTOLIC BLOOD PRESSURE: 122 MMHG | HEART RATE: 61 BPM | OXYGEN SATURATION: 96 % | TEMPERATURE: 97.7 F | HEIGHT: 58 IN | DIASTOLIC BLOOD PRESSURE: 78 MMHG

## 2024-04-02 DIAGNOSIS — M25.561 CHRONIC PAIN OF RIGHT KNEE: Primary | ICD-10-CM

## 2024-04-02 DIAGNOSIS — G89.29 CHRONIC PAIN OF RIGHT KNEE: Primary | ICD-10-CM

## 2024-04-02 DIAGNOSIS — E78.5 HYPERLIPIDEMIA, UNSPECIFIED HYPERLIPIDEMIA TYPE: ICD-10-CM

## 2024-04-02 PROCEDURE — 1157F ADVNC CARE PLAN IN RCRD: CPT | Performed by: INTERNAL MEDICINE

## 2024-04-02 PROCEDURE — 1159F MED LIST DOCD IN RCRD: CPT | Performed by: INTERNAL MEDICINE

## 2024-04-02 PROCEDURE — 1036F TOBACCO NON-USER: CPT | Performed by: INTERNAL MEDICINE

## 2024-04-02 PROCEDURE — 99213 OFFICE O/P EST LOW 20 MIN: CPT | Performed by: INTERNAL MEDICINE

## 2024-04-02 PROCEDURE — 3074F SYST BP LT 130 MM HG: CPT | Performed by: INTERNAL MEDICINE

## 2024-04-02 PROCEDURE — 3078F DIAST BP <80 MM HG: CPT | Performed by: INTERNAL MEDICINE

## 2024-04-02 RX ORDER — ATORVASTATIN CALCIUM 80 MG/1
80 TABLET, FILM COATED ORAL NIGHTLY
Qty: 90 TABLET | Refills: 3 | Status: SHIPPED | OUTPATIENT
Start: 2024-04-02

## 2024-04-02 RX ORDER — MELOXICAM 15 MG/1
15 TABLET ORAL DAILY
Qty: 30 TABLET | Refills: 2 | Status: SHIPPED | OUTPATIENT
Start: 2024-04-02 | End: 2025-04-02

## 2024-04-02 NOTE — PATIENT INSTRUCTIONS
Plan  Started on new pill for R knee pain . Take it once a day with food for 2 weeks then as needed  Recent xray results were discussed with patient. Questions related to it answered. Patient felt satisfied with it.  Patient  was advised to call back if symptoms persist after few days or worsen.   Patient agreed with plan and felt satisfied.  Follow up in September  or as needed.

## 2024-04-02 NOTE — PROGRESS NOTES
"My nurse note reviewed. Patient is here for:  Knee Pain (Knee has been giving out and has been present for 3 months.//Back pain)   She has been having some pain in R knee for couple of months. No fall or injury .   Had xray done recently . Results reviewed and discussed with pt  Needs refill on [facility].     No fever, chills.   Uses 4 wheel walker   Hx of low IQ - no change  OBJECTIVE :  /78   Pulse 61   Temp 36.5 °C (97.7 °F)   Ht 1.473 m (4' 10\")   Wt 59 kg (130 lb)   LMP  (LMP Unknown)   SpO2 96%   BMI 27.17 kg/m²   Knee examination showed normal passive movements. No effusion noted. Local temperature is not elevated. Slight  tenderness noted on medial aspect of R knee. . No calf tenderness noted. Papo's sign was negative      Assessment:  1. Chronic pain of right knee -new med started. Side effects of medication were discussed. All questions related to medication answered to patient's satisfaction     2. Hyperlipidemia, unspecified hyperlipidemia type - refill done.     Plan  Started on new pill for R knee pain . Take it once a day with food for 2 weeks then as needed  Recent xray results were discussed with patient. Questions related to it answered. Patient felt satisfied with it.  Patient  was advised to call back if symptoms persist after few days or worsen.   Patient agreed with plan and felt satisfied.  Follow up in September  or as needed.      "

## 2024-04-15 DIAGNOSIS — I10 BENIGN ESSENTIAL HTN: ICD-10-CM

## 2024-04-15 RX ORDER — LOSARTAN POTASSIUM 100 MG/1
100 TABLET ORAL DAILY
Qty: 90 TABLET | Refills: 3 | Status: SHIPPED | OUTPATIENT
Start: 2024-04-15 | End: 2025-04-15

## 2024-05-06 ENCOUNTER — TELEPHONE (OUTPATIENT)
Dept: PRIMARY CARE | Facility: CLINIC | Age: 74
End: 2024-05-06
Payer: MEDICARE

## 2024-05-08 DIAGNOSIS — E03.9 HYPOTHYROIDISM, UNSPECIFIED TYPE: ICD-10-CM

## 2024-05-08 DIAGNOSIS — J45.909 MILD ASTHMA, UNSPECIFIED WHETHER COMPLICATED, UNSPECIFIED WHETHER PERSISTENT (HHS-HCC): ICD-10-CM

## 2024-05-08 RX ORDER — LEVOTHYROXINE SODIUM 25 UG/1
TABLET ORAL
Qty: 90 TABLET | Refills: 0 | Status: SHIPPED | OUTPATIENT
Start: 2024-05-08

## 2024-05-08 RX ORDER — MONTELUKAST SODIUM 10 MG/1
10 TABLET ORAL NIGHTLY
Qty: 90 TABLET | Refills: 0 | Status: SHIPPED | OUTPATIENT
Start: 2024-05-08

## 2024-05-23 PROBLEM — D12.6 TUBULAR ADENOMA OF COLON: Status: RESOLVED | Noted: 2023-02-16 | Resolved: 2024-05-23

## 2024-05-23 PROBLEM — R10.9 ABDOMINAL DISCOMFORT: Status: RESOLVED | Noted: 2023-05-24 | Resolved: 2024-05-23

## 2024-05-23 PROBLEM — S22.089A FRACTURE OF TWELFTH THORACIC VERTEBRA (MULTI): Status: ACTIVE | Noted: 2024-05-23

## 2024-05-23 PROBLEM — K62.5 RECTAL HEMORRHAGE: Status: RESOLVED | Noted: 2023-02-16 | Resolved: 2024-05-23

## 2024-05-23 PROBLEM — I51.9 HEART PROBLEM: Status: RESOLVED | Noted: 2023-02-16 | Resolved: 2024-05-23

## 2024-05-23 PROBLEM — H11.30 SUBCONJUNCTIVAL HEMORRHAGE: Status: ACTIVE | Noted: 2024-05-23

## 2024-05-23 PROBLEM — B35.3 TINEA PEDIS: Status: ACTIVE | Noted: 2024-05-23

## 2024-05-23 PROBLEM — N39.0 URINARY TRACT INFECTION: Status: RESOLVED | Noted: 2023-02-16 | Resolved: 2024-05-23

## 2024-05-23 PROBLEM — R10.32 LEFT GROIN PAIN: Status: RESOLVED | Noted: 2023-02-16 | Resolved: 2024-05-23

## 2024-05-23 PROBLEM — R04.0 EPISTAXIS: Status: RESOLVED | Noted: 2019-02-01 | Resolved: 2024-05-23

## 2024-05-23 PROBLEM — H01.009 BLEPHARITIS: Status: ACTIVE | Noted: 2024-05-23

## 2024-05-23 PROBLEM — M81.0 OSTEOPOROSIS, POST-MENOPAUSAL: Status: RESOLVED | Noted: 2023-02-16 | Resolved: 2024-05-23

## 2024-05-23 PROBLEM — H65.90 NON-SUPPURATIVE OTITIS MEDIA: Status: ACTIVE | Noted: 2024-05-23

## 2024-05-23 PROBLEM — H43.399 VITREOUS FLOATERS: Status: ACTIVE | Noted: 2024-05-23

## 2024-05-23 PROBLEM — R39.9 UTI SYMPTOMS: Status: RESOLVED | Noted: 2023-02-16 | Resolved: 2024-05-23

## 2024-05-23 PROBLEM — D36.9 TUBULAR ADENOMA: Status: ACTIVE | Noted: 2024-05-23

## 2024-05-23 PROBLEM — R10.9 ABDOMINAL PAIN: Status: RESOLVED | Noted: 2023-02-16 | Resolved: 2024-05-23

## 2024-05-23 PROBLEM — J31.0 CHRONIC RHINITIS: Status: RESOLVED | Noted: 2023-05-24 | Resolved: 2024-05-23

## 2024-05-23 PROBLEM — J01.90 ACUTE SINUSITIS: Status: RESOLVED | Noted: 2023-02-16 | Resolved: 2024-05-23

## 2024-05-23 PROBLEM — S61.409A OPEN WOUND OF HAND: Status: ACTIVE | Noted: 2024-05-23

## 2024-05-23 PROBLEM — K29.70 GASTRITIS: Status: RESOLVED | Noted: 2019-02-01 | Resolved: 2024-05-23

## 2024-05-23 PROBLEM — L30.4 INTERTRIGO: Status: ACTIVE | Noted: 2024-05-23

## 2024-06-14 DIAGNOSIS — J45.909 MILD ASTHMA, UNSPECIFIED WHETHER COMPLICATED, UNSPECIFIED WHETHER PERSISTENT (HHS-HCC): ICD-10-CM

## 2024-06-14 RX ORDER — FLUTICASONE PROPIONATE 50 MCG
2 SPRAY, SUSPENSION (ML) NASAL DAILY
Qty: 16 G | Refills: 0 | Status: CANCELLED | OUTPATIENT
Start: 2024-06-14

## 2024-06-17 ENCOUNTER — APPOINTMENT (OUTPATIENT)
Dept: CARDIOLOGY | Facility: CLINIC | Age: 74
End: 2024-06-17
Payer: MEDICARE

## 2024-06-17 VITALS
WEIGHT: 124 LBS | OXYGEN SATURATION: 99 % | DIASTOLIC BLOOD PRESSURE: 73 MMHG | SYSTOLIC BLOOD PRESSURE: 131 MMHG | BODY MASS INDEX: 25.92 KG/M2 | HEART RATE: 68 BPM

## 2024-06-17 DIAGNOSIS — J45.909 MILD ASTHMA, UNSPECIFIED WHETHER COMPLICATED, UNSPECIFIED WHETHER PERSISTENT (HHS-HCC): ICD-10-CM

## 2024-06-17 DIAGNOSIS — M79.10 MUSCLE PAIN: Primary | ICD-10-CM

## 2024-06-17 DIAGNOSIS — R07.89 ATYPICAL CHEST PAIN: ICD-10-CM

## 2024-06-17 DIAGNOSIS — I10 BENIGN ESSENTIAL HYPERTENSION: ICD-10-CM

## 2024-06-17 DIAGNOSIS — E78.5 HYPERLIPIDEMIA, UNSPECIFIED HYPERLIPIDEMIA TYPE: ICD-10-CM

## 2024-06-17 PROCEDURE — 1159F MED LIST DOCD IN RCRD: CPT | Performed by: INTERNAL MEDICINE

## 2024-06-17 PROCEDURE — 3075F SYST BP GE 130 - 139MM HG: CPT | Performed by: INTERNAL MEDICINE

## 2024-06-17 PROCEDURE — 3078F DIAST BP <80 MM HG: CPT | Performed by: INTERNAL MEDICINE

## 2024-06-17 PROCEDURE — 99214 OFFICE O/P EST MOD 30 MIN: CPT | Performed by: INTERNAL MEDICINE

## 2024-06-17 PROCEDURE — 1036F TOBACCO NON-USER: CPT | Performed by: INTERNAL MEDICINE

## 2024-06-17 PROCEDURE — 1157F ADVNC CARE PLAN IN RCRD: CPT | Performed by: INTERNAL MEDICINE

## 2024-06-17 RX ORDER — FLUTICASONE PROPIONATE 50 MCG
2 SPRAY, SUSPENSION (ML) NASAL DAILY
Qty: 16 G | Refills: 0 | Status: SHIPPED | OUTPATIENT
Start: 2024-06-17

## 2024-06-17 NOTE — PROGRESS NOTES
"Chief Complaint:   Follow-up (Patient is here for a follow up)     History Of Present Illness:    Halley Clements is a 73 y.o. female presenting with cardiovascular disease.  Her \"whole body hurts\"  Not sleeping well    Patient denies chest pain/SOB/palpitations/dizziness/lightheadedness/edema/claudication    Activity limited-using walker.Knees giving out         Last Recorded Vitals:  Vitals:    06/17/24 1300   BP: 131/73   BP Location: Right arm   Patient Position: Sitting   BP Cuff Size: Adult   Pulse: 68   SpO2: 99%   Weight: 56.2 kg (124 lb)            Allergies:  Pneumococcal vaccine, Pneumovax-23 [pneumococcal 23-raquel ps vaccine], and Sulfa (sulfonamide antibiotics)    Outpatient Medications:  Current Outpatient Medications   Medication Instructions    albuterol 90 mcg/actuation inhaler 2 puffs, inhalation, Every 6 hours PRN, EVERY 4-6 HOURS    alendronate (FOSAMAX) 70 mg, oral, Every 7 days, Take in the morning with a full glass of water, on an empty stomach, and do not take anything else by mouth or lie down for the next 30 min.    aspirin 81 mg, oral, Daily    busPIRone (BUSPAR) 5 mg, oral, 2 times daily    calcium carbonate 600 mg calcium (1,500 mg) tablet Take 1 tablet (600 mg) by mouth 2 times a day.    dexAMETHasone (DECADRON) 4 mg, oral, 2 times daily    dicyclomine (BENTYL) 20 mg, oral, Every 8 hours PRN    ergocalciferol (VITAMIN D-2) 1,250 mcg, oral, Every 30 days    fluticasone (Flonase) 50 mcg/actuation nasal spray 2 sprays, Each Nostril, Daily    levothyroxine (Synthroid, Levoxyl) 25 mcg tablet Take 1 tablet by mouth once daily in the morning 30 minutes prior to breakfast.    loratadine (CLARITIN) 10 mg, oral, Daily    losartan (COZAAR) 100 mg, oral, Daily    meloxicam (MOBIC) 15 mg, oral, Daily, For 2 weeks then as needed    metoprolol succinate XL (TOPROL-XL) 25 mg, oral, Daily    montelukast (SINGULAIR) 10 mg, oral, Nightly    multivitamin with minerals (multivit-min-iron fum-folic ac) tablet " 1 tablet, oral, Daily    omeprazole (PRILOSEC) 40 mg, oral, Daily before breakfast       Physical Exam:  Constitutional:       General: Awake.      Appearance: Healthy appearance. Overweight and not in distress. Frail. Chronically ill-appearing.   Neck:      Vascular: No JVR. JVD normal.   Pulmonary:      Effort: Pulmonary effort is normal.      Breath sounds: Normal breath sounds. No wheezing. No rhonchi. No rales.   Chest:      Chest wall: Not tender to palpatation.   Cardiovascular:      PMI at left midclavicular line. Normal rate. Regular rhythm. Normal S1. Normal S2.       Murmurs: There is no murmur.      No gallop.  No click. No rub.   Pulses:     Intact distal pulses.   Edema:     Peripheral edema absent.   Abdominal:      General: Bowel sounds are normal.      Palpations: Abdomen is soft.      Tenderness: There is no abdominal tenderness.   Musculoskeletal: Normal range of motion.         General: No tenderness.      Comments: Walks with walker Skin:     General: Skin is warm and dry.   Neurological:      General: No focal deficit present.      Mental Status: Alert and oriented to person, place and time.          Last Labs:  CBC -  Lab Results   Component Value Date    WBC 6.0 12/08/2023    HGB 13.1 12/08/2023    HCT 41.7 12/08/2023    MCV 94 12/08/2023     12/08/2023       CMP -  Lab Results   Component Value Date    CALCIUM 9.1 12/08/2023    PROT 6.6 12/08/2023    ALBUMIN 4.0 12/08/2023    AST 25 12/08/2023    ALT 13 12/08/2023    ALKPHOS 87 12/08/2023    BILITOT 0.2 12/08/2023       LIPID PANEL -   Lab Results   Component Value Date    CHOL 105 10/13/2023    TRIG 296 (H) 09/03/2023    HDL 40.9 10/13/2023    CHHDL 2.6 10/13/2023    LDLF 90 09/03/2023    VLDL 59 (H) 09/03/2023       RENAL FUNCTION PANEL -   Lab Results   Component Value Date    GLUCOSE 127 (H) 12/08/2023     12/08/2023    K 4.1 12/08/2023     (H) 12/08/2023    CO2 19 (L) 12/08/2023    ANIONGAP 18 12/08/2023    BUN 13  "12/08/2023    CREATININE 0.57 12/08/2023    CALCIUM 9.1 12/08/2023    ALBUMIN 4.0 12/08/2023        Lab Results   Component Value Date     (H) 09/03/2023                 Lab review: I have personally reviewed the laboratory result(s)       Problem List Items Addressed This Visit       Atypical chest pain    Overview     3/2021 stress test  9/2023 hospital W/U negative -NL echo at that time  12/2023 ED W/U negative  Doubted cardiac etiology  No specific CP now-\"has pain all over\"         Benign essential hypertension    Overview     BP stable    12/2023 BMP OK         Hyperlipidemia    Overview     On HI statin  Hold to see if muscle pain improves         Muscle pain - Primary    Overview     Pt on HI statin  Will try holding statin            Hold atorvastatin to see if muscle pains improve    Jarvis Martinez, DO  " Removal of staples

## 2024-06-18 ENCOUNTER — TELEPHONE (OUTPATIENT)
Dept: PRIMARY CARE | Facility: CLINIC | Age: 74
End: 2024-06-18
Payer: MEDICARE

## 2024-06-18 DIAGNOSIS — F41.9 ANXIETY: ICD-10-CM

## 2024-06-18 DIAGNOSIS — M54.50 LOW BACK PAIN WITHOUT SCIATICA, UNSPECIFIED BACK PAIN LATERALITY, UNSPECIFIED CHRONICITY: ICD-10-CM

## 2024-06-18 NOTE — TELEPHONE ENCOUNTER
Patient called stating that her cardiologist suggested she should stop taking calcium for a week or two.

## 2024-06-19 ENCOUNTER — OFFICE VISIT (OUTPATIENT)
Dept: PRIMARY CARE | Facility: CLINIC | Age: 74
End: 2024-06-19
Payer: MEDICARE

## 2024-06-19 VITALS
BODY MASS INDEX: 26.13 KG/M2 | DIASTOLIC BLOOD PRESSURE: 84 MMHG | HEART RATE: 64 BPM | SYSTOLIC BLOOD PRESSURE: 120 MMHG | WEIGHT: 125 LBS | OXYGEN SATURATION: 98 % | TEMPERATURE: 96.6 F

## 2024-06-19 DIAGNOSIS — M25.561 ACUTE PAIN OF BOTH KNEES: ICD-10-CM

## 2024-06-19 DIAGNOSIS — I10 BENIGN ESSENTIAL HTN: ICD-10-CM
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
## 2024-06-19 DIAGNOSIS — M25.561 CHRONIC PAIN OF RIGHT KNEE: ICD-10-CM

## 2024-06-19 DIAGNOSIS — G89.29 CHRONIC PAIN OF RIGHT KNEE: ICD-10-CM

## 2024-06-19 DIAGNOSIS — I47.10 SVT (SUPRAVENTRICULAR TACHYCARDIA) (CMS-HCC): ICD-10-CM

## 2024-06-19 DIAGNOSIS — M81.0 OSTEOPOROSIS, POST-MENOPAUSAL: ICD-10-CM

## 2024-06-19 DIAGNOSIS — M25.562 ACUTE PAIN OF BOTH KNEES: ICD-10-CM

## 2024-06-19 PROCEDURE — 20610 DRAIN/INJ JOINT/BURSA W/O US: CPT | Performed by: INTERNAL MEDICINE

## 2024-06-19 PROCEDURE — 3074F SYST BP LT 130 MM HG: CPT | Performed by: INTERNAL MEDICINE

## 2024-06-19 PROCEDURE — 1036F TOBACCO NON-USER: CPT | Performed by: INTERNAL MEDICINE

## 2024-06-19 PROCEDURE — 1123F ACP DISCUSS/DSCN MKR DOCD: CPT | Performed by: INTERNAL MEDICINE

## 2024-06-19 PROCEDURE — 1157F ADVNC CARE PLAN IN RCRD: CPT | Performed by: INTERNAL MEDICINE

## 2024-06-19 PROCEDURE — G2211 COMPLEX E/M VISIT ADD ON: HCPCS | Performed by: INTERNAL MEDICINE

## 2024-06-19 PROCEDURE — 1159F MED LIST DOCD IN RCRD: CPT | Performed by: INTERNAL MEDICINE

## 2024-06-19 PROCEDURE — 99214 OFFICE O/P EST MOD 30 MIN: CPT | Performed by: INTERNAL MEDICINE

## 2024-06-19 PROCEDURE — 3079F DIAST BP 80-89 MM HG: CPT | Performed by: INTERNAL MEDICINE

## 2024-06-19 RX ORDER — MELOXICAM 15 MG/1
15 TABLET ORAL DAILY
Qty: 30 TABLET | Refills: 2 | Status: SHIPPED | OUTPATIENT
Start: 2024-06-19 | End: 2025-06-19

## 2024-06-19 RX ORDER — TRIAMCINOLONE ACETONIDE 40 MG/ML
40 INJECTION, SUSPENSION INTRA-ARTICULAR; INTRAMUSCULAR ONCE
Status: COMPLETED | OUTPATIENT
Start: 2024-06-19 | End: 2024-06-19

## 2024-06-19 RX ORDER — ALENDRONATE SODIUM 70 MG/1
70 TABLET ORAL
Qty: 13 TABLET | Refills: 3 | Status: SHIPPED | OUTPATIENT
Start: 2024-06-19

## 2024-06-19 ASSESSMENT — PATIENT HEALTH QUESTIONNAIRE - PHQ9
1. LITTLE INTEREST OR PLEASURE IN DOING THINGS: NOT AT ALL
2. FEELING DOWN, DEPRESSED OR HOPELESS: NOT AT ALL
SUM OF ALL RESPONSES TO PHQ9 QUESTIONS 1 AND 2: 0

## 2024-06-19 NOTE — PATIENT INSTRUCTIONS
Plan  Procedure: After discussing risks/benefits, answering questions related to procedure, under local anaesthetic, 40 mg of Kenalog with 1 cc of 1 % Lidocaine  was injected from medial aspect of R  knee at most tender spot. Patient tolerated procedure well. Band-Aid applied at site of injection.  Refill on Meloxicam done   Continue current meds   Patient  was advised to call back if symptoms persist after few days or worsen.   Patient agreed with plan and felt satisfied.  Follow up in sept as scheduled or as needed.

## 2024-06-19 NOTE — PROGRESS NOTES
My nurse note reviewed. Patient is here for:  Follow-up       Pt is here for follow up on recent fall .  She fell on her 's wheel chair .   C/O whole body pain and achiness but mostly in knees  Had xray knee few months ago , reviewed and discussed    No passing out     Patient denies any shortness of breath, PND, orthopnea, chest pain , palpitation, syncope or edema in legs    Has low IQ   Today did not bring her walker     OBJECTIVE :  /84   Pulse 64   Temp 35.9 °C (96.6 °F)   Wt 56.7 kg (125 lb)   LMP  (LMP Unknown)   SpO2 98%   BMI 26.13 kg/m²   CVS: S1 S2 + , no S3. No loud heart murmur appreciated. Lungs clear, No edema  Low IQ as before   Moving all 4 extremites  Hip movements ok , has limping on R knee on walking   Knee examination showed normal passive movements. No effusion noted. Local temperature is not elevated. no significant tenderness noted. No calf tenderness noted. Papo's sign was negative  Extremities exam didnot reveal any swelling in legs or calf tenderness. Papo's sign was negative.    Assessment:  1. Fall, initial encounter    2. SVT (supraventricular tachycardia) (CMS-HCC) -hx of. Stable currently    3. Acute pain of both knees -NSAIDs , cortisone shot R knee   4. Benign essential HTN -controlled     5. Chronic pain of right knee    6. Osteoporosis, post-menopausal -hx of. Refill done     Plan  Procedure: After discussing risks/benefits, answering questions related to procedure, under local anaesthetic, 40 mg of Kenalog with 1 cc of 1 % Lidocaine  was injected from medial aspect of R  knee at most tender spot. Patient tolerated procedure well. Band-Aid applied at site of injection.  Refill on Meloxicam done   Continue current meds   Patient  was advised to call back if symptoms persist after few days or worsen.   Patient agreed with plan and felt satisfied.  Follow up in sept as scheduled or as needed.

## 2024-07-09 DIAGNOSIS — J45.909 MILD ASTHMA, UNSPECIFIED WHETHER COMPLICATED, UNSPECIFIED WHETHER PERSISTENT (HHS-HCC): ICD-10-CM

## 2024-07-09 RX ORDER — FLUTICASONE PROPIONATE 50 MCG
2 SPRAY, SUSPENSION (ML) NASAL DAILY
Qty: 16 G | Refills: 0 | Status: SHIPPED | OUTPATIENT
Start: 2024-07-09

## 2024-07-15 ENCOUNTER — APPOINTMENT (OUTPATIENT)
Dept: PRIMARY CARE | Facility: CLINIC | Age: 74
End: 2024-07-15
Payer: MEDICARE

## 2024-07-23 DIAGNOSIS — J45.909 MILD ASTHMA, UNSPECIFIED WHETHER COMPLICATED, UNSPECIFIED WHETHER PERSISTENT (HHS-HCC): ICD-10-CM

## 2024-07-23 RX ORDER — FLUTICASONE PROPIONATE 50 MCG
2 SPRAY, SUSPENSION (ML) NASAL DAILY
Qty: 16 G | Refills: 0 | Status: SHIPPED | OUTPATIENT
Start: 2024-07-23

## 2024-07-23 RX ORDER — ALBUTEROL SULFATE 90 UG/1
2 AEROSOL, METERED RESPIRATORY (INHALATION) EVERY 6 HOURS PRN
Qty: 18 G | Refills: 3 | Status: SHIPPED | OUTPATIENT
Start: 2024-07-23

## 2024-08-06 DIAGNOSIS — J45.909 MILD ASTHMA, UNSPECIFIED WHETHER COMPLICATED, UNSPECIFIED WHETHER PERSISTENT (HHS-HCC): ICD-10-CM

## 2024-08-06 DIAGNOSIS — E03.9 HYPOTHYROIDISM, UNSPECIFIED TYPE: ICD-10-CM

## 2024-08-07 RX ORDER — MONTELUKAST SODIUM 10 MG/1
10 TABLET ORAL NIGHTLY
Qty: 90 TABLET | Refills: 0 | Status: SHIPPED | OUTPATIENT
Start: 2024-08-07

## 2024-08-07 RX ORDER — LEVOTHYROXINE SODIUM 25 UG/1
TABLET ORAL
Qty: 90 TABLET | Refills: 0 | Status: SHIPPED | OUTPATIENT
Start: 2024-08-07

## 2024-09-25 ENCOUNTER — APPOINTMENT (OUTPATIENT)
Dept: PRIMARY CARE | Facility: CLINIC | Age: 74
End: 2024-09-25
Payer: MEDICARE

## 2024-09-25 VITALS
BODY MASS INDEX: 24.45 KG/M2 | HEART RATE: 58 BPM | DIASTOLIC BLOOD PRESSURE: 72 MMHG | TEMPERATURE: 97.2 F | SYSTOLIC BLOOD PRESSURE: 138 MMHG | OXYGEN SATURATION: 98 % | WEIGHT: 117 LBS

## 2024-09-25 DIAGNOSIS — F41.9 ANXIETY: ICD-10-CM

## 2024-09-25 DIAGNOSIS — Z78.0 POSTMENOPAUSAL: ICD-10-CM

## 2024-09-25 DIAGNOSIS — Z71.89 ADVANCE DIRECTIVE DISCUSSED WITH PATIENT: ICD-10-CM

## 2024-09-25 DIAGNOSIS — I10 BENIGN ESSENTIAL HTN: ICD-10-CM

## 2024-09-25 DIAGNOSIS — M81.0 OSTEOPOROSIS, POST-MENOPAUSAL: ICD-10-CM

## 2024-09-25 DIAGNOSIS — F81.9 INTELLECTUAL DELAY: ICD-10-CM

## 2024-09-25 DIAGNOSIS — F09 COGNITIVE DYSFUNCTION: ICD-10-CM

## 2024-09-25 DIAGNOSIS — Z00.00 MEDICARE ANNUAL WELLNESS VISIT, SUBSEQUENT: Primary | ICD-10-CM

## 2024-09-25 DIAGNOSIS — Z13.31 DEPRESSION SCREEN: ICD-10-CM

## 2024-09-25 PROCEDURE — 90662 IIV NO PRSV INCREASED AG IM: CPT | Performed by: INTERNAL MEDICINE

## 2024-09-25 PROCEDURE — 1157F ADVNC CARE PLAN IN RCRD: CPT | Performed by: INTERNAL MEDICINE

## 2024-09-25 PROCEDURE — 1170F FXNL STATUS ASSESSED: CPT | Performed by: INTERNAL MEDICINE

## 2024-09-25 PROCEDURE — G0444 DEPRESSION SCREEN ANNUAL: HCPCS | Performed by: INTERNAL MEDICINE

## 2024-09-25 PROCEDURE — 1123F ACP DISCUSS/DSCN MKR DOCD: CPT | Performed by: INTERNAL MEDICINE

## 2024-09-25 PROCEDURE — 3078F DIAST BP <80 MM HG: CPT | Performed by: INTERNAL MEDICINE

## 2024-09-25 PROCEDURE — 99497 ADVNCD CARE PLAN 30 MIN: CPT | Performed by: INTERNAL MEDICINE

## 2024-09-25 PROCEDURE — G0439 PPPS, SUBSEQ VISIT: HCPCS | Performed by: INTERNAL MEDICINE

## 2024-09-25 PROCEDURE — 99214 OFFICE O/P EST MOD 30 MIN: CPT | Performed by: INTERNAL MEDICINE

## 2024-09-25 PROCEDURE — 3075F SYST BP GE 130 - 139MM HG: CPT | Performed by: INTERNAL MEDICINE

## 2024-09-25 PROCEDURE — 1036F TOBACCO NON-USER: CPT | Performed by: INTERNAL MEDICINE

## 2024-09-25 PROCEDURE — G0008 ADMIN INFLUENZA VIRUS VAC: HCPCS | Performed by: INTERNAL MEDICINE

## 2024-09-25 ASSESSMENT — PATIENT HEALTH QUESTIONNAIRE - PHQ9
2. FEELING DOWN, DEPRESSED OR HOPELESS: NOT AT ALL
1. LITTLE INTEREST OR PLEASURE IN DOING THINGS: NOT AT ALL
SUM OF ALL RESPONSES TO PHQ9 QUESTIONS 1 AND 2: 2
2. FEELING DOWN, DEPRESSED OR HOPELESS: SEVERAL DAYS
1. LITTLE INTEREST OR PLEASURE IN DOING THINGS: SEVERAL DAYS
SUM OF ALL RESPONSES TO PHQ9 QUESTIONS 1 AND 2: 0

## 2024-09-25 ASSESSMENT — ACTIVITIES OF DAILY LIVING (ADL)
MANAGING_FINANCES: INDEPENDENT
DRESSING: INDEPENDENT
BATHING: INDEPENDENT
TAKING_MEDICATION: INDEPENDENT
GROCERY_SHOPPING: INDEPENDENT

## 2024-09-25 NOTE — PROGRESS NOTES
My nurse note reviewed. Patient is here for:  Follow-up (Spider bites) and Medicare Annual Wellness Visit Subsequent     Pt is here for medicare wellness, physical and follow up     Has low IQ   Here with her  who also has low IQ  Patient denies any shortness of breath, PND, orthopnea, chest pain , palpitation, syncope or edema in legs  patient denies any abdominal pain, tenderness, nausea, vomiting, change in bowel habits or blood in stool.  Patient denies any weakness in extremities.. Denies any headache, visual symptoms , speech problems or  tremors . No TIA or stroke like symptoms..    Taking meds ok   Had questions about medications timing, discussed with her    Her  is in nursing home now.     Taking meds ok   Uses 4 wheel walker     Over the past 2 weeks, how often have you been bothered by any of the following problems?  Little interest or pleasure in doing things: Several days  Feeling down, depressed, or hopeless: Several days  Functional Ability/Level of Safety  Cognitive Impairment Observed:  cognitive impairment observed  Cognitive Impairment Reported: No cognitive impairment reported by patient or family  Nutrition and Exercise  Current Diet: Well Balanced Diet  Adequate Fluid Intake: Yes  Caffeine: No  Exercise Frequency: No Exercise  IADL's  Grocery Shopping: Independent  Taking Medication: Independent  Managing Finances: Independent  Falls Home Safety Risk Factors  Home Safety Risk Factors: None     During Medicare wellness apart from looking at assessment done by nurse,  I also asked following :    Alcohol use : Alcohol screening  was  done during this visit. Patient is not having any issue with increase alcohol use . No ETOH abuse was observed by history . No drinking at all  Non smoker  Depression screen:  > 5 minutes  were spent screening for depression using PHQ2/PHQ9 as documented in the chart.    HIV test: patient was not found to be high risk for HIV     Cognitive  issue : None     Advance directives:. Advance Care Planning discussed and documented in the medical record, patient did not wish or was not able to name a surrogate decision maker or provide an advance care plan. Patient has no living will. Patient has no healthcare POA. Total time was > 16 min    Additional Information: discussed about living will. Questions answered to patient's satisfaction.    I have reviewed all active medications patient is currently on . Questions related to medication answered to patient's satisfaction.    REVIEW OF SYSTEMS:   All other systems have been reviewed and are negative in relation to patient's complaint and other than what is mentioned in History of present illness.    OBJECTIVE :  /72   Pulse 58   Temp 36.2 °C (97.2 °F)   Wt 53.1 kg (117 lb)   LMP  (LMP Unknown)   SpO2 98%   BMI 24.45 kg/m²   Vitals noted   Not in acute distress  Conj Pink, No icterus  Neck:No Cervical LN enlargement, No Thyroid enlargement No carotid bruit  Lungs: good air entry bilaterally, no rales or rhonchi  CVS: S1 S2 + , no S3. No loud heart murmur heard.   Breast examination: Breasts are symmetric. No dominant or discrete suspicious masses noted in breast area.  No nipple changes or discharge noted.  Abdomen: Soft, non tender , BS +. no organomegaly , no CVA tenderness  MSK: No spine tenderness or muscle tenderness noted on gross examination  CNS: Pt is alert, moving all 4 extremities. no motor weakness or abnormal movements noted on gross examination. Has low IQ  Extremities: No edema, No calf tenderness, Papo's sign negative.    Assessment:  1. Medicare annual wellness visit, subsequent  Done. Tests ordered      2. Advance directive discussed with patient        3. Depression screen        4. Benign essential HTN  controlled        5. Osteoporosis, post-menopausal  Hx of. DEXA ordered      6. Anxiety  Hx of. Doing ok      7. Cognitive dysfunction        8. Intellectual delay  Hx of.          Plan  Medicare wellness done.  Blood tests ordered  Flu shot   Bone density test   Current medications are effective. advised to continue current medications.  Questions related to medication answered  Fall prevention discussed    F/U 6 months and in 12 months for medicare wellness.

## 2024-09-25 NOTE — PATIENT INSTRUCTIONS
Plan  Medicare wellness done.  Blood tests ordered  Flu shot   Bone density test   Current medications are effective. advised to continue current medications.  Questions related to medication answered  Fall prevention discussed    F/U 6 months and in 12 months for medicare wellness.

## 2024-09-26 ENCOUNTER — APPOINTMENT (OUTPATIENT)
Dept: PRIMARY CARE | Facility: CLINIC | Age: 74
End: 2024-09-26
Payer: MEDICARE

## 2024-10-07 DIAGNOSIS — E03.9 HYPOTHYROIDISM, UNSPECIFIED TYPE: ICD-10-CM

## 2024-10-07 DIAGNOSIS — J45.909 MILD ASTHMA, UNSPECIFIED WHETHER COMPLICATED, UNSPECIFIED WHETHER PERSISTENT (HHS-HCC): ICD-10-CM

## 2024-10-07 DIAGNOSIS — M54.50 LOW BACK PAIN WITHOUT SCIATICA, UNSPECIFIED BACK PAIN LATERALITY, UNSPECIFIED CHRONICITY: ICD-10-CM

## 2024-10-07 RX ORDER — CALCIUM CARBONATE 600 MG
TABLET ORAL
Qty: 180 TABLET | Refills: 2 | Status: SHIPPED | OUTPATIENT
Start: 2024-10-07

## 2024-10-07 RX ORDER — LEVOTHYROXINE SODIUM 25 UG/1
TABLET ORAL
Qty: 90 TABLET | Refills: 0 | Status: SHIPPED | OUTPATIENT
Start: 2024-10-07

## 2024-10-07 RX ORDER — MONTELUKAST SODIUM 10 MG/1
10 TABLET ORAL NIGHTLY
Qty: 90 TABLET | Refills: 0 | Status: SHIPPED | OUTPATIENT
Start: 2024-10-07

## 2024-10-28 DIAGNOSIS — J45.909 MILD ASTHMA, UNSPECIFIED WHETHER COMPLICATED, UNSPECIFIED WHETHER PERSISTENT (HHS-HCC): ICD-10-CM

## 2024-10-28 DIAGNOSIS — K21.9 GASTROESOPHAGEAL REFLUX DISEASE, UNSPECIFIED WHETHER ESOPHAGITIS PRESENT: ICD-10-CM

## 2024-10-28 RX ORDER — OMEPRAZOLE 40 MG/1
40 CAPSULE, DELAYED RELEASE ORAL
Qty: 90 CAPSULE | Refills: 3 | Status: SHIPPED | OUTPATIENT
Start: 2024-10-28

## 2024-10-28 RX ORDER — ALBUTEROL SULFATE 90 UG/1
2 INHALANT RESPIRATORY (INHALATION) EVERY 6 HOURS PRN
Qty: 18 G | Refills: 3 | Status: SHIPPED | OUTPATIENT
Start: 2024-10-28

## 2024-10-28 RX ORDER — FLUTICASONE PROPIONATE 50 MCG
2 SPRAY, SUSPENSION (ML) NASAL DAILY
Qty: 16 G | Refills: 0 | Status: SHIPPED | OUTPATIENT
Start: 2024-10-28

## 2024-11-15 DIAGNOSIS — J45.909 MILD ASTHMA, UNSPECIFIED WHETHER COMPLICATED, UNSPECIFIED WHETHER PERSISTENT (HHS-HCC): ICD-10-CM

## 2024-11-15 RX ORDER — FLUTICASONE PROPIONATE 50 MCG
2 SPRAY, SUSPENSION (ML) NASAL DAILY
Qty: 17 G | Refills: 3 | Status: SHIPPED | OUTPATIENT
Start: 2024-11-15

## 2024-12-06 DIAGNOSIS — I10 BENIGN ESSENTIAL HTN: ICD-10-CM

## 2024-12-07 RX ORDER — METOPROLOL SUCCINATE 25 MG/1
25 TABLET, EXTENDED RELEASE ORAL DAILY
Qty: 90 TABLET | Refills: 2 | Status: SHIPPED | OUTPATIENT
Start: 2024-12-07

## 2024-12-17 ENCOUNTER — APPOINTMENT (OUTPATIENT)
Dept: CARDIOLOGY | Facility: CLINIC | Age: 74
End: 2024-12-17
Payer: MEDICARE

## 2025-03-12 ENCOUNTER — APPOINTMENT (OUTPATIENT)
Dept: PRIMARY CARE | Facility: CLINIC | Age: 75
End: 2025-03-12
Payer: MEDICARE

## 2025-03-12 VITALS
HEART RATE: 62 BPM | TEMPERATURE: 98.7 F | DIASTOLIC BLOOD PRESSURE: 77 MMHG | WEIGHT: 125.6 LBS | BODY MASS INDEX: 26.25 KG/M2 | SYSTOLIC BLOOD PRESSURE: 123 MMHG | OXYGEN SATURATION: 96 %

## 2025-03-12 DIAGNOSIS — R26.81 GAIT INSTABILITY: ICD-10-CM

## 2025-03-12 DIAGNOSIS — F09 COGNITIVE DYSFUNCTION: ICD-10-CM

## 2025-03-12 DIAGNOSIS — F81.9 INTELLECTUAL DELAY: ICD-10-CM

## 2025-03-12 DIAGNOSIS — Z91.81 HISTORY OF RECENT FALL: ICD-10-CM

## 2025-03-12 DIAGNOSIS — S80.212D ABRASION OF LEFT KNEE, SUBSEQUENT ENCOUNTER: ICD-10-CM

## 2025-03-12 DIAGNOSIS — R07.81 RIB PAIN ON LEFT SIDE: Primary | ICD-10-CM

## 2025-03-12 DIAGNOSIS — Z12.31 ENCOUNTER FOR SCREENING MAMMOGRAM FOR BREAST CANCER: ICD-10-CM

## 2025-03-12 DIAGNOSIS — I10 BENIGN ESSENTIAL HTN: ICD-10-CM

## 2025-03-12 DIAGNOSIS — F41.9 ANXIETY: ICD-10-CM

## 2025-03-12 DIAGNOSIS — J45.20 MILD INTERMITTENT ASTHMA WITHOUT COMPLICATION (HHS-HCC): ICD-10-CM

## 2025-03-12 PROBLEM — F33.1 MAJOR DEPRESSIVE DISORDER, RECURRENT, MODERATE: Status: RESOLVED | Noted: 2017-12-05 | Resolved: 2025-03-12

## 2025-03-12 PROCEDURE — 1123F ACP DISCUSS/DSCN MKR DOCD: CPT | Performed by: INTERNAL MEDICINE

## 2025-03-12 PROCEDURE — 99214 OFFICE O/P EST MOD 30 MIN: CPT | Performed by: INTERNAL MEDICINE

## 2025-03-12 PROCEDURE — 3074F SYST BP LT 130 MM HG: CPT | Performed by: INTERNAL MEDICINE

## 2025-03-12 PROCEDURE — 3078F DIAST BP <80 MM HG: CPT | Performed by: INTERNAL MEDICINE

## 2025-03-12 PROCEDURE — 1157F ADVNC CARE PLAN IN RCRD: CPT | Performed by: INTERNAL MEDICINE

## 2025-03-12 PROCEDURE — 1159F MED LIST DOCD IN RCRD: CPT | Performed by: INTERNAL MEDICINE

## 2025-03-12 PROCEDURE — 1036F TOBACCO NON-USER: CPT | Performed by: INTERNAL MEDICINE

## 2025-03-12 PROCEDURE — G2211 COMPLEX E/M VISIT ADD ON: HCPCS | Performed by: INTERNAL MEDICINE

## 2025-03-12 NOTE — PROGRESS NOTES
My nurse note reviewed. Patient is here for:  Follow-up (6 mo follow up - Sometimes feels dizzy/Fell in her apartment yesterday, hit her head, lt knee issue now/Pain in lt shoulder radiating to elbow and pain in hand at night/Would like order for mammogram)     Fell yesterday while taking out of garage. Hurt in L rib cage and L knee mainly . Has hx of shoulder pain on L side from before.   Patient denies any shortness of breath, PND, orthopnea, chest pain , palpitation, syncope or edema in legs  No passing out.   Hx of low IQ.  is in nursing home for 5 months or so.     OBJECTIVE :  /77   Pulse 62   Temp 37.1 °C (98.7 °F)   Wt 57 kg (125 lb 9.6 oz)   LMP  (LMP Unknown)   SpO2 96%   BMI 26.25 kg/m²   Low IQ  C/O pain in L rib cage on palpation . No bruise noted on skin . CVS: S1 S2 + , no S3. No loud heart murmur appreciated. Lungs clear, No edema  Passive shoulder movements ok   Has small abrasion on ant part of L knee with peeling of skin. L knee movements ok . No effusion .   Assessment:  1. Rib pain on left side with fall r/O  fracture XR ribs left 2 views      2. Benign essential HTN  controlled  On med. Continue it      3. Anxiety  Hx of. On buspar      4. Intellectual delay  Hx of      5. Cognitive dysfunction        6. Gait instability  Has walker      7. Mild intermittent asthma without complication ([facility])  Hx of. Stable on current meds      8. Abrasion of left knee, subsequent encounter  Keep area clean with soap and water then dry it and apply Neosporin or bacitracin OTC ointment to affected area once daily.        9. History of recent fall  Fall prevention       10. Encounter for screening mammogram for breast cancer  BI mammo bilateral screening tomosynthesis      During office visit today patient's chronic diagnosis were reviewed and questions related to it answered to patient's satisfaction . Risk factors for heart, stroke were discussed with patient . Discussion about  Have Your Skin Lesions Been Treated?: not been treated preventative tests were done with patient also . pain issue was discussed as appropriate for patient . I plan to follow up patient's chronic medical conditions as appropriate.     Plan  Fall prevention discussed   Xray L ribs ordered  Mammogram  Local ice for 2-3 days then warm compress  Tylenol 2 tablets twice  a day as needed  Make appt in Sept for Medicare wellness.        Is This A New Presentation, Or A Follow-Up?: Skin Lesions

## 2025-03-12 NOTE — PATIENT INSTRUCTIONS
Plan  Fall prevention discussed   Xray L ribs ordered  Mammogram  Local ice for 2-3 days then warm compress  Tylenol 2 tablets twice  a day as needed  Make appt in Sept for Medicare wellness.

## 2025-03-17 ENCOUNTER — HOSPITAL ENCOUNTER (OUTPATIENT)
Dept: RADIOLOGY | Facility: CLINIC | Age: 75
Discharge: HOME | End: 2025-03-17
Payer: MEDICARE

## 2025-03-17 DIAGNOSIS — Z12.31 ENCOUNTER FOR SCREENING MAMMOGRAM FOR BREAST CANCER: ICD-10-CM

## 2025-03-17 PROCEDURE — 77063 BREAST TOMOSYNTHESIS BI: CPT | Performed by: RADIOLOGY

## 2025-03-17 PROCEDURE — 77067 SCR MAMMO BI INCL CAD: CPT | Performed by: RADIOLOGY

## 2025-03-17 PROCEDURE — 77063 BREAST TOMOSYNTHESIS BI: CPT

## 2025-03-19 ENCOUNTER — APPOINTMENT (OUTPATIENT)
Dept: RADIOLOGY | Facility: HOSPITAL | Age: 75
End: 2025-03-19
Payer: MEDICARE

## 2025-03-19 ENCOUNTER — HOSPITAL ENCOUNTER (EMERGENCY)
Facility: HOSPITAL | Age: 75
Discharge: HOME | End: 2025-03-19
Attending: EMERGENCY MEDICINE
Payer: MEDICARE

## 2025-03-19 VITALS
OXYGEN SATURATION: 96 % | WEIGHT: 125 LBS | HEIGHT: 58 IN | RESPIRATION RATE: 18 BRPM | BODY MASS INDEX: 26.24 KG/M2 | TEMPERATURE: 98.1 F | SYSTOLIC BLOOD PRESSURE: 172 MMHG | DIASTOLIC BLOOD PRESSURE: 92 MMHG | HEART RATE: 101 BPM

## 2025-03-19 DIAGNOSIS — R07.81 RIB PAIN ON LEFT SIDE: Primary | ICD-10-CM
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
## 2025-03-19 DIAGNOSIS — J18.9 PNEUMONIA DUE TO INFECTIOUS ORGANISM, UNSPECIFIED LATERALITY, UNSPECIFIED PART OF LUNG: ICD-10-CM

## 2025-03-19 PROCEDURE — 99283 EMERGENCY DEPT VISIT LOW MDM: CPT | Performed by: EMERGENCY MEDICINE

## 2025-03-19 PROCEDURE — 71101 X-RAY EXAM UNILAT RIBS/CHEST: CPT | Mod: LEFT SIDE | Performed by: RADIOLOGY

## 2025-03-19 PROCEDURE — 2500000001 HC RX 250 WO HCPCS SELF ADMINISTERED DRUGS (ALT 637 FOR MEDICARE OP)

## 2025-03-19 PROCEDURE — 2500000005 HC RX 250 GENERAL PHARMACY W/O HCPCS

## 2025-03-19 PROCEDURE — 71101 X-RAY EXAM UNILAT RIBS/CHEST: CPT | Mod: LT

## 2025-03-19 RX ORDER — IBUPROFEN 600 MG/1
600 TABLET ORAL ONCE
Status: COMPLETED | OUTPATIENT
Start: 2025-03-19 | End: 2025-03-19

## 2025-03-19 RX ORDER — DICLOFENAC SODIUM 10 MG/G
4 GEL TOPICAL 4 TIMES DAILY PRN
Qty: 50 G | Refills: 0 | Status: SHIPPED | OUTPATIENT
Start: 2025-03-19

## 2025-03-19 RX ORDER — LIDOCAINE 560 MG/1
1 PATCH PERCUTANEOUS; TOPICAL; TRANSDERMAL ONCE
Status: DISCONTINUED | OUTPATIENT
Start: 2025-03-19 | End: 2025-03-19 | Stop reason: HOSPADM

## 2025-03-19 RX ORDER — LIDOCAINE 50 MG/G
1 PATCH TOPICAL DAILY
Qty: 5 PATCH | Refills: 0 | Status: SHIPPED | OUTPATIENT
Start: 2025-03-19

## 2025-03-19 RX ORDER — DOXYCYCLINE 100 MG/1
100 CAPSULE ORAL 2 TIMES DAILY
Qty: 10 CAPSULE | Refills: 0 | Status: SHIPPED | OUTPATIENT
Start: 2025-03-19 | End: 2025-03-24

## 2025-03-19 RX ADMIN — IBUPROFEN 600 MG: 600 TABLET, FILM COATED ORAL at 20:49

## 2025-03-19 RX ADMIN — LIDOCAINE 4% 1 PATCH: 40 PATCH TOPICAL at 20:49

## 2025-03-19 ASSESSMENT — COLUMBIA-SUICIDE SEVERITY RATING SCALE - C-SSRS
6. HAVE YOU EVER DONE ANYTHING, STARTED TO DO ANYTHING, OR PREPARED TO DO ANYTHING TO END YOUR LIFE?: NO
2. HAVE YOU ACTUALLY HAD ANY THOUGHTS OF KILLING YOURSELF?: NO
1. IN THE PAST MONTH, HAVE YOU WISHED YOU WERE DEAD OR WISHED YOU COULD GO TO SLEEP AND NOT WAKE UP?: NO

## 2025-03-19 NOTE — ED TRIAGE NOTES
Pt presents to department from home after falling a week ago. Pt c/o L sided pain. Pt states she was taking the garage out when she fell and the garbage can on the L side. Pt states scheduled xray for 3/24 but cannot wait due to pain. No acute distresss noted, respirations are even and unlabored.

## 2025-03-19 NOTE — ED TRIAGE NOTES
This patient was seen in triage.     Vitals are noted.      HPI:  Patient is a 74-year-old female presenting to the emergency department with left rib pain status post trip and fall a week ago, states that she was taking the trash out.  Has an outpatient order for rib x-ray but states that she cannot wait to have it done due to the pain.  Does report some shortness of breath with inspiration.  Denies any lightheadedness, does report some dizziness but states this is chronic.  Does report that she hit the back of her head a week ago, denies any headaches.  Not on any anticoagulants.    Focused PE:  Gen: Well-appearing, not in acute distress  Cardiovascular: Regular rate, normal rhythm, no murmur, no gallop  MSK: Point tender to lateral aspect left ribs around ribs 3 through 5, no crepitus, no ecchymosis  Respiratory: No adventitious lung sounds auscultated.  Abdomen: No reproducible abdominal tenderness upon palpation,  physical exam may be limited by patient positioning sitting up in a chair  Neuro:  Alert and Oriented, speech clear and coherent     Plan:     Imaging     For the remainder of the patient's workup and ED course, please see the main ED provider note.  We discussed need for diagnostic testing including lab studies and imaging.  We also discussed that they may be asked to wait in the waiting room while these test are pending.  They understand that if they choose to leave without having the testing completed or resulted that we cannot rule out acute life-threatening illnesses and the risks involved to lead to worsening condition, permanent disability or even death.

## 2025-03-20 NOTE — ED PROVIDER NOTES
HPI   Chief Complaint   Patient presents with    Rib Injury       Patient is a 74-year-old female who presents on 3/19/2025 for evaluation of significant left-sided rib pain. She reports that she had a fall while taking out the trash on 3/11 and hit the left side of her chest wall. She had an order for outpatient XR to be done but was unable to wait due to the severity of her pain. She has taken Tylenol for her pain which she says provided no relief.       History provided by:  Patient          Patient History   Past Medical History:   Diagnosis Date    Age-related osteoporosis without current pathological fracture 11/07/2013    Osteoporosis    Allergy, unspecified, initial encounter 11/07/2013    Allergic reaction    Cellulitis, unspecified 11/07/2013    Cellulitis    Chronic frontal sinusitis 11/07/2013    Chronic frontal sinusitis    Conjunctival hemorrhage, unspecified eye 11/07/2013    Subconjunctival hemorrhage    Contusion of unspecified thigh, initial encounter 11/07/2013    Contusion of thigh    Depression, unspecified 11/07/2013    Depression    Disorder of the skin and subcutaneous tissue, unspecified 11/07/2013    Skin disorder    Encounter for immunization 11/07/2013    Need for DTP vaccine    Encounter for screening for malignant neoplasm of colon     Encounter for screening colonoscopy    Epistaxis 11/07/2013    Epistaxis    Erythema intertrigo 11/07/2013    Intertrigo    Flushing 11/07/2013    Flushing    Furuncle of perineum 11/07/2013    Furuncle of perineum    Gastritis, unspecified, without bleeding 11/07/2013    Gastritis    Herpesviral infection, unspecified 11/07/2013    Herpes simplex    Noninfective gastroenteritis and colitis, unspecified 11/07/2013    Noninfectious gastroenteritis    Other allergy status, other than to drugs and biological substances     History of environmental allergies    Other conditions influencing health status 11/07/2013    A Fall    Other conditions influencing  health status 11/07/2013    Rodent Bite    Other fatigue 11/07/2013    Fatigue    Other specified disorders of nose and nasal sinuses 10/19/2020    Nasal valve stenosis    Other vitreous opacities, unspecified eye 11/07/2013    Vitreous floaters    Otitis media, unspecified, unspecified ear 11/07/2013    Otitis media    Pain in unspecified hip 11/07/2013    Joint pain, hip    Person consulting for explanation of examination or test findings     Visit for review of DEXA scan    Personal history of other diseases of the circulatory system     History of hypertension    Personal history of other diseases of the female genital tract 10/04/2017    History of postmenopausal bleeding    Personal history of other diseases of the female genital tract 02/26/2016    History of postmenopausal bleeding    Personal history of other diseases of the musculoskeletal system and connective tissue 11/07/2013    History of backache    Personal history of other diseases of the respiratory system 09/28/2022    History of deviated nasal septum    Personal history of other diseases of the respiratory system 04/14/2015    History of acute sinusitis    Personal history of other medical treatment     History of mammogram    Personal history of other specified conditions     History of snoring    Pneumonia, unspecified organism 11/07/2013    Pneumonitis    Postmenopausal bleeding 11/07/2013    Postmenopausal bleeding    Tinea pedis 11/07/2013    Tinea pedis    Unspecified asthma, uncomplicated ([facility]) 11/07/2013    Asthma    Unspecified blepharitis unspecified eye, unspecified eyelid 11/07/2013    Blepharitis    Unspecified hemorrhoids 11/07/2013    Hemorrhoids    Unspecified mental disorder due to known physiological condition 09/20/2017    Cognitive dysfunction    Unspecified nonsuppurative otitis media, unspecified ear 11/07/2013    Nonsuppurative otitis media    Unspecified open wound of unspecified hand, initial encounter 11/07/2013     Open wound of hand    Unspecified osteoarthritis, unspecified site 07/02/2014    Osteoarthrosis, unspecified whether generalized or localized, other specified sites    Wedge compression fracture of T11-T12 vertebra, initial encounter for closed fracture (Multi) 12/01/2021    Wedge compression fracture of T11-T12 vertebra, initial encounter for closed fracture     Past Surgical History:   Procedure Laterality Date    HYSTERECTOMY  02/26/2016    Hysterectomy    OTHER SURGICAL HISTORY  03/03/2014    Vaccines Prophylactic Need Against Bacterial Diseases    OTHER SURGICAL HISTORY  07/18/2018    Reported Hx Of Hip Replacement - Bilateral    OTHER SURGICAL HISTORY  11/07/2022    Arm surgery    OTHER SURGICAL HISTORY  02/26/2016    Brain Surgery    TUBAL LIGATION  10/04/2017    Tubal Ligation     Family History   Problem Relation Name Age of Onset    Cancer Mother       Social History     Tobacco Use    Smoking status: Never    Smokeless tobacco: Never   Vaping Use    Vaping status: Never Used   Substance Use Topics    Alcohol use: Not on file    Drug use: Not on file       Physical Exam   ED Triage Vitals   Temperature Heart Rate Respirations BP   03/19/25 1840 03/19/25 1841 03/19/25 1840 03/19/25 1841   36.7 °C (98.1 °F) (!) 101 18 (!) 172/92      Pulse Ox Temp Source Heart Rate Source Patient Position   03/19/25 1841 03/19/25 1840 03/19/25 1840 03/19/25 1841   96 % Temporal Monitor Sitting      BP Location FiO2 (%)     03/19/25 1841 --     Left arm        Physical Exam  Vitals and nursing note reviewed.   Constitutional:       General: She is not in acute distress.     Appearance: She is well-developed.   HENT:      Head: Normocephalic and atraumatic.   Eyes:      Conjunctiva/sclera: Conjunctivae normal.   Cardiovascular:      Rate and Rhythm: Regular rhythm. Tachycardia present.      Heart sounds: No murmur heard.  Pulmonary:      Effort: Pulmonary effort is normal. No respiratory distress.      Breath sounds: Normal  breath sounds.   Chest:      Chest wall: Tenderness present.      Comments: Tenderness on palpation of the left lateral chest wall.  Abdominal:      Palpations: Abdomen is soft.      Tenderness: There is no abdominal tenderness.   Musculoskeletal:         General: No swelling.      Cervical back: Neck supple.   Skin:     General: Skin is warm and dry.      Capillary Refill: Capillary refill takes less than 2 seconds.   Neurological:      Mental Status: She is alert.   Psychiatric:         Mood and Affect: Mood normal.           ED Course & MDM   ED Course as of 03/19/25 2143   Wed Mar 19, 2025   2014 XR ribs 2 views left w chest pa or ap [BT]   2142 Ibuprofen 600mg and lidocaine patch administered. []   2142 Findings discussed with patient, she is agreeable to discharge home. Voltaren gel, lidocaine patches, and incentive spirometer prescribed at discharge. []      ED Course User Index  [BT] Devyn Avila DO  [] Ousmane Vázquez MD         Diagnoses as of 03/19/25 2143   Rib pain on left side   Fall, initial encounter                 No data recorded     [place] Coma Scale Score: 15 (03/19/25 1842 : Luisa Canela RN)                           Medical Decision Making  Patient presented on 3/19/2025 for evaluation of left lateral chest wall pain following a fall the week prior. XR imaging shows no acute fracture, findings notable for atelectasis vs pneumonitis. Ibuprofen 600mg, lidocaine patch, and incentive spirometer was ordered for the patient. Findings were discussed, patient encouraged to follow up with PCP. 5-day course of doxycycline and incentive spirometer prescribed for suspected pneumonitis/pneumonia vs atelectasis. Voltaren gel and lidocaine patches prescribed for pain management. Patient discharged home, received instructions to follow up with PCP.    Amount and/or Complexity of Data Reviewed  Radiology: ordered. Decision-making details documented in ED Course.        Procedure  Procedures      Ousmane Vázquez MD  Resident  03/19/25 5601

## 2025-03-20 NOTE — DISCHARGE INSTRUCTIONS
You were prescribed a course of doxycycline. Take 1 tablet twice a day for 5 days.   You were prescribed diclofenac gel, also known as Voltaren. Apply Voltaren to the site of your pain 4 times a day as needed.  You were prescribed lidocaine patches for pain. Apply 1 lidocaine patch to the site of your pain for a maximum of 12 hours per day.  You were given an incentive spirometer, a device to help with deep breathing. Take 10 deep slow breaths every hour.   Follow up with your primary care provider regarding your visit to the emergency department.

## 2025-03-21 ENCOUNTER — APPOINTMENT (OUTPATIENT)
Dept: RADIOLOGY | Facility: HOSPITAL | Age: 75
End: 2025-03-21
Payer: MEDICARE

## 2025-03-21 ENCOUNTER — HOSPITAL ENCOUNTER (EMERGENCY)
Facility: HOSPITAL | Age: 75
Discharge: HOME | End: 2025-03-21
Attending: STUDENT IN AN ORGANIZED HEALTH CARE EDUCATION/TRAINING PROGRAM
Payer: MEDICARE

## 2025-03-21 ENCOUNTER — APPOINTMENT (OUTPATIENT)
Dept: CARDIOLOGY | Facility: HOSPITAL | Age: 75
End: 2025-03-21
Payer: MEDICARE

## 2025-03-21 VITALS
OXYGEN SATURATION: 99 % | WEIGHT: 125 LBS | HEART RATE: 101 BPM | DIASTOLIC BLOOD PRESSURE: 101 MMHG | RESPIRATION RATE: 18 BRPM | SYSTOLIC BLOOD PRESSURE: 157 MMHG | TEMPERATURE: 97.5 F | BODY MASS INDEX: 26.13 KG/M2

## 2025-03-21 DIAGNOSIS — R10.9 ABDOMINAL PAIN, UNSPECIFIED ABDOMINAL LOCATION: Primary | ICD-10-CM

## 2025-03-21 DIAGNOSIS — R07.81 RIB PAIN ON LEFT SIDE: ICD-10-CM

## 2025-03-21 DIAGNOSIS — R11.2 NAUSEA AND VOMITING, UNSPECIFIED VOMITING TYPE: ICD-10-CM

## 2025-03-21 DIAGNOSIS — R07.9 CHEST PAIN, UNSPECIFIED TYPE: ICD-10-CM

## 2025-03-21 LAB
ALBUMIN SERPL BCP-MCNC: 4.6 G/DL (ref 3.4–5)
ALP SERPL-CCNC: 96 U/L (ref 33–136)
ALT SERPL W P-5'-P-CCNC: 15 U/L (ref 7–45)
ANION GAP SERPL CALC-SCNC: 12 MMOL/L (ref 10–20)
APPEARANCE UR: CLEAR
AST SERPL W P-5'-P-CCNC: 26 U/L (ref 9–39)
BASOPHILS # BLD AUTO: 0.02 X10*3/UL (ref 0–0.1)
BASOPHILS NFR BLD AUTO: 0.3 %
BILIRUB SERPL-MCNC: 0.4 MG/DL (ref 0–1.2)
BILIRUB UR STRIP.AUTO-MCNC: NEGATIVE MG/DL
BUN SERPL-MCNC: 12 MG/DL (ref 6–23)
CALCIUM SERPL-MCNC: 9.6 MG/DL (ref 8.6–10.3)
CARDIAC TROPONIN I PNL SERPL HS: 5 NG/L (ref 0–13)
CARDIAC TROPONIN I PNL SERPL HS: 6 NG/L (ref 0–13)
CHLORIDE SERPL-SCNC: 107 MMOL/L (ref 98–107)
CO2 SERPL-SCNC: 24 MMOL/L (ref 21–32)
COLOR UR: ABNORMAL
CREAT SERPL-MCNC: 0.58 MG/DL (ref 0.5–1.05)
EGFRCR SERPLBLD CKD-EPI 2021: >90 ML/MIN/1.73M*2
EOSINOPHIL # BLD AUTO: 0.11 X10*3/UL (ref 0–0.4)
EOSINOPHIL NFR BLD AUTO: 1.6 %
ERYTHROCYTE [DISTWIDTH] IN BLOOD BY AUTOMATED COUNT: 13.5 % (ref 11.5–14.5)
GLUCOSE SERPL-MCNC: 101 MG/DL (ref 74–99)
GLUCOSE UR STRIP.AUTO-MCNC: NORMAL MG/DL
HCT VFR BLD AUTO: 42.6 % (ref 36–46)
HGB BLD-MCNC: 13.7 G/DL (ref 12–16)
IMM GRANULOCYTES # BLD AUTO: 0.01 X10*3/UL (ref 0–0.5)
IMM GRANULOCYTES NFR BLD AUTO: 0.1 % (ref 0–0.9)
KETONES UR STRIP.AUTO-MCNC: NEGATIVE MG/DL
LEUKOCYTE ESTERASE UR QL STRIP.AUTO: NEGATIVE
LIPASE SERPL-CCNC: 12 U/L (ref 9–82)
LYMPHOCYTES # BLD AUTO: 2.01 X10*3/UL (ref 0.8–3)
LYMPHOCYTES NFR BLD AUTO: 29 %
MAGNESIUM SERPL-MCNC: 2.08 MG/DL (ref 1.6–2.4)
MCH RBC QN AUTO: 29.5 PG (ref 26–34)
MCHC RBC AUTO-ENTMCNC: 32.2 G/DL (ref 32–36)
MCV RBC AUTO: 92 FL (ref 80–100)
MONOCYTES # BLD AUTO: 0.47 X10*3/UL (ref 0.05–0.8)
MONOCYTES NFR BLD AUTO: 6.8 %
NEUTROPHILS # BLD AUTO: 4.32 X10*3/UL (ref 1.6–5.5)
NEUTROPHILS NFR BLD AUTO: 62.2 %
NITRITE UR QL STRIP.AUTO: NEGATIVE
NRBC BLD-RTO: 0 /100 WBCS (ref 0–0)
PH UR STRIP.AUTO: 6 [PH]
PLATELET # BLD AUTO: 268 X10*3/UL (ref 150–450)
POTASSIUM SERPL-SCNC: 4.4 MMOL/L (ref 3.5–5.3)
PROT SERPL-MCNC: 7.4 G/DL (ref 6.4–8.2)
PROT UR STRIP.AUTO-MCNC: NEGATIVE MG/DL
RBC # BLD AUTO: 4.65 X10*6/UL (ref 4–5.2)
RBC # UR STRIP.AUTO: NEGATIVE MG/DL
SODIUM SERPL-SCNC: 139 MMOL/L (ref 136–145)
SP GR UR STRIP.AUTO: >1.05
UROBILINOGEN UR STRIP.AUTO-MCNC: NORMAL MG/DL
WBC # BLD AUTO: 6.9 X10*3/UL (ref 4.4–11.3)

## 2025-03-21 PROCEDURE — 73060 X-RAY EXAM OF HUMERUS: CPT | Mod: LT

## 2025-03-21 PROCEDURE — 2550000001 HC RX 255 CONTRASTS: Performed by: NURSE PRACTITIONER

## 2025-03-21 PROCEDURE — 73030 X-RAY EXAM OF SHOULDER: CPT | Mod: LEFT SIDE | Performed by: RADIOLOGY

## 2025-03-21 PROCEDURE — 36415 COLL VENOUS BLD VENIPUNCTURE: CPT | Performed by: NURSE PRACTITIONER

## 2025-03-21 PROCEDURE — 73080 X-RAY EXAM OF ELBOW: CPT | Mod: LT

## 2025-03-21 PROCEDURE — 80053 COMPREHEN METABOLIC PANEL: CPT | Performed by: NURSE PRACTITIONER

## 2025-03-21 PROCEDURE — 99285 EMERGENCY DEPT VISIT HI MDM: CPT | Mod: 25 | Performed by: STUDENT IN AN ORGANIZED HEALTH CARE EDUCATION/TRAINING PROGRAM

## 2025-03-21 PROCEDURE — 74177 CT ABD & PELVIS W/CONTRAST: CPT | Performed by: RADIOLOGY

## 2025-03-21 PROCEDURE — 84484 ASSAY OF TROPONIN QUANT: CPT | Performed by: NURSE PRACTITIONER

## 2025-03-21 PROCEDURE — 93005 ELECTROCARDIOGRAM TRACING: CPT

## 2025-03-21 PROCEDURE — 73110 X-RAY EXAM OF WRIST: CPT | Mod: LT

## 2025-03-21 PROCEDURE — 85025 COMPLETE CBC W/AUTO DIFF WBC: CPT | Performed by: NURSE PRACTITIONER

## 2025-03-21 PROCEDURE — 83735 ASSAY OF MAGNESIUM: CPT | Performed by: NURSE PRACTITIONER

## 2025-03-21 PROCEDURE — 73090 X-RAY EXAM OF FOREARM: CPT | Mod: LEFT SIDE | Performed by: RADIOLOGY

## 2025-03-21 PROCEDURE — 36415 COLL VENOUS BLD VENIPUNCTURE: CPT

## 2025-03-21 PROCEDURE — 73090 X-RAY EXAM OF FOREARM: CPT | Mod: LT

## 2025-03-21 PROCEDURE — 96374 THER/PROPH/DIAG INJ IV PUSH: CPT | Mod: 59

## 2025-03-21 PROCEDURE — 71100 X-RAY EXAM RIBS UNI 2 VIEWS: CPT | Mod: LT

## 2025-03-21 PROCEDURE — 2500000004 HC RX 250 GENERAL PHARMACY W/ HCPCS (ALT 636 FOR OP/ED)

## 2025-03-21 PROCEDURE — 73080 X-RAY EXAM OF ELBOW: CPT | Mod: LEFT SIDE | Performed by: RADIOLOGY

## 2025-03-21 PROCEDURE — 73030 X-RAY EXAM OF SHOULDER: CPT | Mod: LT

## 2025-03-21 PROCEDURE — 81003 URINALYSIS AUTO W/O SCOPE: CPT | Performed by: NURSE PRACTITIONER

## 2025-03-21 PROCEDURE — 73110 X-RAY EXAM OF WRIST: CPT | Mod: LEFT SIDE | Performed by: RADIOLOGY

## 2025-03-21 PROCEDURE — 74177 CT ABD & PELVIS W/CONTRAST: CPT

## 2025-03-21 PROCEDURE — 71046 X-RAY EXAM CHEST 2 VIEWS: CPT

## 2025-03-21 PROCEDURE — 73060 X-RAY EXAM OF HUMERUS: CPT | Mod: LEFT SIDE | Performed by: RADIOLOGY

## 2025-03-21 PROCEDURE — 71100 X-RAY EXAM RIBS UNI 2 VIEWS: CPT | Mod: LEFT SIDE | Performed by: RADIOLOGY

## 2025-03-21 PROCEDURE — 71046 X-RAY EXAM CHEST 2 VIEWS: CPT | Mod: FOREIGN READ | Performed by: RADIOLOGY

## 2025-03-21 PROCEDURE — 83690 ASSAY OF LIPASE: CPT | Performed by: NURSE PRACTITIONER

## 2025-03-21 PROCEDURE — 84484 ASSAY OF TROPONIN QUANT: CPT

## 2025-03-21 RX ORDER — ONDANSETRON 4 MG/1
4 TABLET, ORALLY DISINTEGRATING ORAL EVERY 8 HOURS PRN
Qty: 20 TABLET | Refills: 0 | Status: SHIPPED | OUTPATIENT
Start: 2025-03-21 | End: 2025-03-28

## 2025-03-21 RX ORDER — KETOROLAC TROMETHAMINE 30 MG/ML
15 INJECTION, SOLUTION INTRAMUSCULAR; INTRAVENOUS ONCE
Status: COMPLETED | OUTPATIENT
Start: 2025-03-21 | End: 2025-03-21

## 2025-03-21 RX ORDER — NAPROXEN 500 MG/1
500 TABLET ORAL
Qty: 30 TABLET | Refills: 0 | Status: SHIPPED | OUTPATIENT
Start: 2025-03-21 | End: 2025-04-05

## 2025-03-21 RX ADMIN — IOHEXOL 75 ML: 350 INJECTION, SOLUTION INTRAVENOUS at 13:02

## 2025-03-21 RX ADMIN — KETOROLAC TROMETHAMINE 15 MG: 30 INJECTION, SOLUTION INTRAMUSCULAR at 16:12

## 2025-03-21 ASSESSMENT — COLUMBIA-SUICIDE SEVERITY RATING SCALE - C-SSRS
2. HAVE YOU ACTUALLY HAD ANY THOUGHTS OF KILLING YOURSELF?: NO
6. HAVE YOU EVER DONE ANYTHING, STARTED TO DO ANYTHING, OR PREPARED TO DO ANYTHING TO END YOUR LIFE?: NO
1. IN THE PAST MONTH, HAVE YOU WISHED YOU WERE DEAD OR WISHED YOU COULD GO TO SLEEP AND NOT WAKE UP?: NO

## 2025-03-21 NOTE — ED TRIAGE NOTES
TRIAGE NOTE   I saw the patient as the Clinician in Triage and performed a brief history and physical exam, established acuity, and ordered appropriate tests to develop basic plan of care. Patient will be seen by an [facility], resident and/or physician who will independently evaluate the patient. Please see subsequent provider notes for further details and disposition.     Brief HPI: In brief, Hlaley Clements is a 74 y.o. female with significant PMH for osteoporosis, hypothyroidism, HTN, anxiety, HLD, IBS, and depression presenting to ED today from home by herself for evaluation of abdominal pain.  2 days ago the patient was started on doxycycline for a possible developing pneumonia as she had some left-sided chest pain.  Today after taking the doxycycline, the patient threw up and has had persistent abdominal pain concentrated in the left lower quadrant.  Pain occasionally radiates into the left chest.  Endorses persistent nausea.  Denies fever/chills, cough/cold symptoms, shortness of breath, dysuria/hematuria, change in bowel habits or any other complaints.  No smoking, EtOH or IV drugs.  PCP is Dr. Santiago.     Focused Physical exam:   General: 74-year-old female, awake and alert, oriented x 3.  Well-nourished and hydrated.  Nontoxic looking.  Skin: Pink, warm and dry.  Cardiac: Regular rate and rhythm.  Pulmonary: Clear bilaterally.  Abdomen: Round and soft with bowel sounds, tender in the left lower quadrant.  No rebound or guarding.  No palpable organomegaly.  No CVA tenderness.    Plan/MDM:    74 y.o. female with significant PMH for osteoporosis, hypothyroidism, HTN, anxiety, HLD, IBS, and depression is evaluated at the bedside for 1 episode of vomiting and abdominal pain that occasionally radiates into the chest after taking doxycycline earlier today.  On arrival to the ED, vital signs within normal limits.  Afebrile.  Lungs clear, abdomen soft, rounded with bowel sounds and tender in the left lower quadrant.   IV established, will check basic labs, EKG, chest x-ray and CT ab/pelvis with contrast will be performed in preparation for further evaluation in the main ED.  Patient is agreeable to this plan.    Please see subsequent provider note for further details and disposition

## 2025-03-21 NOTE — ED TRIAGE NOTES
Pt presents to ED c/o epigastric abdominal pain and left upper chest pain that began this morning. Pt states pain and nausea began shortly after taking abx on an empty stomach. Pt denies SOB.

## 2025-03-21 NOTE — ED PROVIDER NOTES
HPI   Chief Complaint   Patient presents with    Abdominal Pain    Chest Pain       74-year-old female presenting to the emergency department due to abdominal pain.  Patient reports she was started on doxycycline 2 days ago for concern of pneumonia associated with left-sided chest wall pain.  Patient initially was evaluated on 3/19 to be evaluated after a mechanical fall while taking her trash can to the end of the driveway on 3/11.  She reports she fell onto her left side at that time and has been experiencing constant left arm pain ranging from the shoulder to the wrist in addition to the left-sided chest pain.  She had a prior injury to left arm last year and is concerned she re-injured something after her recent fall. Additionally, after taking her doxycycline this morning she developed left lower quadrant abdominal pain and nausea. Reported 1 episode of nonbloody emesis. Denies any fevers, chills, cough/cold symptoms, shortness of breath, bowel changes, or urinary symptoms.  No reported smoking, alcohol, or illicit drug.               Patient History   Past Medical History:   Diagnosis Date    Age-related osteoporosis without current pathological fracture 11/07/2013    Osteoporosis    Allergy, unspecified, initial encounter 11/07/2013    Allergic reaction    Cellulitis, unspecified 11/07/2013    Cellulitis    Chronic frontal sinusitis 11/07/2013    Chronic frontal sinusitis    Conjunctival hemorrhage, unspecified eye 11/07/2013    Subconjunctival hemorrhage    Contusion of unspecified thigh, initial encounter 11/07/2013    Contusion of thigh    Depression, unspecified 11/07/2013    Depression    Disorder of the skin and subcutaneous tissue, unspecified 11/07/2013    Skin disorder    Encounter for immunization 11/07/2013    Need for DTP vaccine    Encounter for screening for malignant neoplasm of colon     Encounter for screening colonoscopy    Epistaxis 11/07/2013    Epistaxis    Erythema intertrigo 11/07/2013     Intertrigo    Flushing 11/07/2013    Flushing    Furuncle of perineum 11/07/2013    Furuncle of perineum    Gastritis, unspecified, without bleeding 11/07/2013    Gastritis    Herpesviral infection, unspecified 11/07/2013    Herpes simplex    Noninfective gastroenteritis and colitis, unspecified 11/07/2013    Noninfectious gastroenteritis    Other allergy status, other than to drugs and biological substances     History of environmental allergies    Other conditions influencing health status 11/07/2013    A Fall    Other conditions influencing health status 11/07/2013    Rodent Bite    Other fatigue 11/07/2013    Fatigue    Other specified disorders of nose and nasal sinuses 10/19/2020    Nasal valve stenosis    Other vitreous opacities, unspecified eye 11/07/2013    Vitreous floaters    Otitis media, unspecified, unspecified ear 11/07/2013    Otitis media    Pain in unspecified hip 11/07/2013    Joint pain, hip    Person consulting for explanation of examination or test findings     Visit for review of DEXA scan    Personal history of other diseases of the circulatory system     History of hypertension    Personal history of other diseases of the female genital tract 10/04/2017    History of postmenopausal bleeding    Personal history of other diseases of the female genital tract 02/26/2016    History of postmenopausal bleeding    Personal history of other diseases of the musculoskeletal system and connective tissue 11/07/2013    History of backache    Personal history of other diseases of the respiratory system 09/28/2022    History of deviated nasal septum    Personal history of other diseases of the respiratory system 04/14/2015    History of acute sinusitis    Personal history of other medical treatment     History of mammogram    Personal history of other specified conditions     History of snoring    Pneumonia, unspecified organism 11/07/2013    Pneumonitis    Postmenopausal bleeding 11/07/2013     Postmenopausal bleeding    Tinea pedis 11/07/2013    Tinea pedis    Unspecified asthma, uncomplicated ([facility] 11/07/2013    Asthma    Unspecified blepharitis unspecified eye, unspecified eyelid 11/07/2013    Blepharitis    Unspecified hemorrhoids 11/07/2013    Hemorrhoids    Unspecified mental disorder due to known physiological condition 09/20/2017    Cognitive dysfunction    Unspecified nonsuppurative otitis media, unspecified ear 11/07/2013    Nonsuppurative otitis media    Unspecified open wound of unspecified hand, initial encounter 11/07/2013    Open wound of hand    Unspecified osteoarthritis, unspecified site 07/02/2014    Osteoarthrosis, unspecified whether generalized or localized, other specified sites    Wedge compression fracture of T11-T12 vertebra, initial encounter for closed fracture (Multi) 12/01/2021    Wedge compression fracture of T11-T12 vertebra, initial encounter for closed fracture     Past Surgical History:   Procedure Laterality Date    HYSTERECTOMY  02/26/2016    Hysterectomy    OTHER SURGICAL HISTORY  03/03/2014    Vaccines Prophylactic Need Against Bacterial Diseases    OTHER SURGICAL HISTORY  07/18/2018    Reported Hx Of Hip Replacement - Bilateral    OTHER SURGICAL HISTORY  11/07/2022    Arm surgery    OTHER SURGICAL HISTORY  02/26/2016    Brain Surgery    TUBAL LIGATION  10/04/2017    Tubal Ligation     Family History   Problem Relation Name Age of Onset    Cancer Mother       Social History     Tobacco Use    Smoking status: Never    Smokeless tobacco: Never   Vaping Use    Vaping status: Never Used   Substance Use Topics    Alcohol use: Not on file    Drug use: Not on file       Physical Exam   ED Triage Vitals [03/21/25 1039]   Temperature Heart Rate Respirations BP   36.4 °C (97.5 °F) 71 18 174/83      Pulse Ox Temp Source Heart Rate Source Patient Position   96 % Tympanic Monitor Sitting      BP Location FiO2 (%)     Right arm --       Physical Exam  Constitutional:        General: She is not in acute distress.     Appearance: She is not ill-appearing or toxic-appearing.   HENT:      Head: Atraumatic.      Nose: Nose normal.      Mouth/Throat:      Mouth: Mucous membranes are moist.      Pharynx: Oropharynx is clear.   Eyes:      Extraocular Movements: Extraocular movements intact.      Conjunctiva/sclera: Conjunctivae normal.   Cardiovascular:      Rate and Rhythm: Normal rate and regular rhythm.   Pulmonary:      Effort: Pulmonary effort is normal.      Breath sounds: Normal breath sounds.   Chest:      Comments: Left sided anterior rib tenderness.   Abdominal:      General: Abdomen is flat. Bowel sounds are normal.      Palpations: Abdomen is soft.      Tenderness: There is no abdominal tenderness. There is no right CVA tenderness or left CVA tenderness.   Musculoskeletal:      Cervical back: Normal range of motion and neck supple.      Comments: Tenderness to palpation of the left shoulder and forearm.  No erythema, ecchymosis, warmth, or swelling.  Limited ROM of LUE secondary to pain. Compartments are soft. Sensation is intact. Radial and ulnar pulses 2+.   Skin:     General: Skin is warm and dry.      Capillary Refill: Capillary refill takes less than 2 seconds.   Neurological:      General: No focal deficit present.      Mental Status: She is alert and oriented to person, place, and time.   Psychiatric:         Mood and Affect: Mood normal.           ED Course & MDM   ED Course as of 03/21/25 1657   Fri Mar 21, 2025   1605 Interpreted by the Emergency Department Attending: ECG revealed normal sinus rhythm at a rate of 77 beats per minute with RI interval 120 , QRS of 80 , QTc of 436.  No acute injury pattern. Previous EKG on December 8 revealed no significant changes.    [MG]      ED Course User Index  [MG] Markell Ahuja DO         Diagnoses as of 03/21/25 1657   Abdominal pain, unspecified abdominal location   Nausea and vomiting, unspecified vomiting type   Chest pain,  unspecified type                 No data recorded     [place] Coma Scale Score: 15 (03/21/25 1042 : Gillian Multani RN)                           Medical Decision Making  74-year-old female presenting to the emergency department due to abdominal pain, nausea, and 1 episode of non-bloody emesis.  Reported recent mechanical fall onto her left side. Previously evaluated in the ED and given an incentive spirometer, lidocaine patches, and Voltaren gel for rib pain.  Additionally covered with doxycycline for concern of underlying pneumonia.  Vital signs reviewed and stable.  Patient is overall well-appearing and not in any acute distress.  She is nontoxic in appearance.  Lungs are clear to auscultation bilaterally.  Respirations are even and unlabored.  There is tenderness to palpation over the left sided anterior rib cage. Abdomen soft, nontender, with equal bowel sounds throughout.  Patient denies any nausea at this time.  There is no erythema, ecchymosis, swelling or obvious deformity to the left upper extremity.  There is limited ROM secondary to pain.  LUE neurovascularly intact.  Toradol administered in the ED for pain.  EKG as interpreted in the ED course, no acute injury pattern.  High-sensitivity troponin negative x 2 making an ACS cause unlikely.  CBC without leukocytosis or anemia.  CMP unremarkable no significant electrolyte or metabolic derangements. Lipase WNL. UA without indication of an acute urinary tract infection.  CXR showing left lower lobe atelectasis, no consolidation or hazy opacity indicative of underlying pneumonia.  Patient was informed of findings and told she can stop the antibiotic if she prefers.  If she wishes to continue, we discussed taking the antibiotic with food and plenty of water as well as avoid lying down for 30 minutes after taking the medication.  CT abdomen and pelvis negative for any acute findings. X-rays of left upper extremity without any acute fractures or dislocations.  There is a chronic deformity of the distal radial metaphysis possibly related to prior trauma with mild soft tissue swelling of the wrist.  Patient was informed of all lab and imaging findings including incidentals, all questions and concerns were answered.  Patient states she feels comfortable going home at this time. Prescribed naproxen as needed for pain and Zofran as needed for nausea at home.  Instructed to schedule a follow-up appointment with physician within 2 days regarding today's ED visit.  We discussed strict return precautions to return to the ED with any new or worsening symptoms.  Patient was agreeable to plan of care and discharged in stable.        Procedure  Procedures     Thelma Perez PA-C  03/21/25 0002

## 2025-03-21 NOTE — DISCHARGE INSTRUCTIONS
You should take your doxycycline antibiotic as prescribed with food, lots of water, and avoid lying down for 30 minutes after taking the medication. Prescribed naproxen as needed for pain.  You can apply lidocaine patches to the left anterior rib cage for further pain control.  Zofran was also prescribed as needed for nausea.  Advised to schedule a follow-up appointment with your primary care physician within 2 days regarding today's ED visit.  Return to the ED with any new or worsening symptoms including worsening pain, fevers, chills, shortness of breath, chest pain, severe nausea or vomiting, additional falls or injuries.

## 2025-03-22 LAB — HOLD SPECIMEN: NORMAL

## 2025-03-24 ENCOUNTER — HOSPITAL ENCOUNTER (OUTPATIENT)
Dept: RADIOLOGY | Facility: CLINIC | Age: 75
Discharge: HOME | End: 2025-03-24
Payer: MEDICARE

## 2025-03-24 DIAGNOSIS — Z78.0 POSTMENOPAUSAL: ICD-10-CM

## 2025-03-24 LAB
ATRIAL RATE: 77 BPM
P AXIS: -18 DEGREES
P OFFSET: 204 MS
P ONSET: 157 MS
PR INTERVAL: 120 MS
Q ONSET: 217 MS
QRS COUNT: 12 BEATS
QRS DURATION: 80 MS
QT INTERVAL: 386 MS
QTC CALCULATION(BAZETT): 436 MS
QTC FREDERICIA: 419 MS
R AXIS: 7 DEGREES
T AXIS: 98 DEGREES
T OFFSET: 410 MS
VENTRICULAR RATE: 77 BPM

## 2025-03-26 ENCOUNTER — APPOINTMENT (OUTPATIENT)
Dept: PRIMARY CARE | Facility: CLINIC | Age: 75
End: 2025-03-26
Payer: MEDICARE

## 2025-03-26 ENCOUNTER — APPOINTMENT (OUTPATIENT)
Dept: RADIOLOGY | Facility: CLINIC | Age: 75
End: 2025-03-26
Payer: MEDICARE

## 2025-04-01 ENCOUNTER — HOSPITAL ENCOUNTER (OUTPATIENT)
Dept: RADIOLOGY | Facility: CLINIC | Age: 75
Discharge: HOME | End: 2025-04-01
Payer: MEDICARE

## 2025-04-01 PROCEDURE — 77080 DXA BONE DENSITY AXIAL: CPT | Performed by: RADIOLOGY

## 2025-04-01 PROCEDURE — 77080 DXA BONE DENSITY AXIAL: CPT

## 2025-04-08 ENCOUNTER — TELEMEDICINE (OUTPATIENT)
Dept: PRIMARY CARE | Facility: CLINIC | Age: 75
End: 2025-04-08
Payer: MEDICARE

## 2025-04-08 DIAGNOSIS — M81.0 OSTEOPOROSIS, UNSPECIFIED OSTEOPOROSIS TYPE, UNSPECIFIED PATHOLOGICAL FRACTURE PRESENCE: Primary | ICD-10-CM

## 2025-04-08 DIAGNOSIS — M81.0 OSTEOPOROSIS, POST-MENOPAUSAL: ICD-10-CM

## 2025-04-08 DIAGNOSIS — J30.9 ALLERGIC RHINITIS, UNSPECIFIED SEASONALITY, UNSPECIFIED TRIGGER: ICD-10-CM

## 2025-04-08 RX ORDER — ALENDRONATE SODIUM 70 MG/1
70 TABLET ORAL
Qty: 13 TABLET | Refills: 3 | Status: SHIPPED | OUTPATIENT
Start: 2025-04-08 | End: 2025-04-09 | Stop reason: SDUPTHER

## 2025-04-08 NOTE — PROGRESS NOTES
" \"This visit was completed via telephone due to the restrictions of the COVID-19 pandemic and/or other reason. All issues as below were discussed and addressed but no physical exam was performed. If it was felt that the patient should be evaluated in clinic then they were directed there. The patient verbally consented to visit.\"    Talked to patient over phone .    pt had DEXA scan done which was abnormal and showed Osteoporosis . Discussed results with patient over phone. All questions related to it answered . Discussed about starting new medication for Osteoporosis, Fosamax. Patient agreed with plan. Questions related to medication answered with patient. Common side effects discussed. Patient  understood it well.    Has runny nose off and on for few weeks now, no fever, chills. sorethroat  OBJECTIVE :  Over phone patient didn't seem to be  in any acute distress . patient is alert, oriented, answering appropriately to questions asked.  Physical exam was not performed due to telephone visit .    Assessment:  Osteoporosis  Runny nose    Plan:  Discussed about osteoporosis and management of it.   Discussed medication Fosamax. Side effects discussed. Patient agreed to start medication . Prescription sent to Pharmacy   Advised to take loratidine , otc , for runny nose . She takes flonase also as per her  Regular exercise discussed  Calcium and vit D supplements were discussed   Suggested to repeat DEXA in 2-3 years  Patient agreed with plan and felt satisfied  I spent more than 12 minutes of time with patient discussing diagnosis, management and coordinating care . All questions were answered to patients satisfaction . Patient felt satisfied with it.    "

## 2025-04-09 ENCOUNTER — TELEPHONE (OUTPATIENT)
Dept: PRIMARY CARE | Facility: CLINIC | Age: 75
End: 2025-04-09
Payer: MEDICARE

## 2025-04-09 DIAGNOSIS — M81.0 OSTEOPOROSIS, POST-MENOPAUSAL: ICD-10-CM

## 2025-04-09 RX ORDER — ALENDRONATE SODIUM 70 MG/1
70 TABLET ORAL
Qty: 13 TABLET | Refills: 3 | Status: SHIPPED | OUTPATIENT
Start: 2025-04-09

## 2025-07-04 DIAGNOSIS — M54.50 LOW BACK PAIN WITHOUT SCIATICA, UNSPECIFIED BACK PAIN LATERALITY, UNSPECIFIED CHRONICITY: ICD-10-CM

## 2025-07-07 RX ORDER — PSYLLIUM HUSK 0.4 G
1500 CAPSULE ORAL 2 TIMES DAILY
Qty: 180 TABLET | Refills: 1 | Status: SHIPPED | OUTPATIENT
Start: 2025-07-07 | End: 2025-07-11 | Stop reason: SDUPTHER

## 2025-07-11 DIAGNOSIS — M54.50 LOW BACK PAIN WITHOUT SCIATICA, UNSPECIFIED BACK PAIN LATERALITY, UNSPECIFIED CHRONICITY: ICD-10-CM

## 2025-07-13 RX ORDER — PSYLLIUM HUSK 0.4 G
1500 CAPSULE ORAL 2 TIMES DAILY
Qty: 180 TABLET | Refills: 1 | Status: SHIPPED | OUTPATIENT
Start: 2025-07-13

## 2025-07-14 ENCOUNTER — APPOINTMENT (OUTPATIENT)
Dept: CARDIOLOGY | Facility: HOSPITAL | Age: 75
End: 2025-07-14
Payer: MEDICARE

## 2025-07-14 ENCOUNTER — APPOINTMENT (OUTPATIENT)
Dept: RADIOLOGY | Facility: HOSPITAL | Age: 75
End: 2025-07-14
Payer: MEDICARE

## 2025-07-14 ENCOUNTER — HOSPITAL ENCOUNTER (EMERGENCY)
Facility: HOSPITAL | Age: 75
Discharge: HOME | End: 2025-07-14
Payer: MEDICARE

## 2025-07-14 VITALS
HEIGHT: 59 IN | OXYGEN SATURATION: 97 % | TEMPERATURE: 96.6 F | DIASTOLIC BLOOD PRESSURE: 65 MMHG | SYSTOLIC BLOOD PRESSURE: 142 MMHG | HEART RATE: 72 BPM | BODY MASS INDEX: 25.2 KG/M2 | WEIGHT: 125 LBS | RESPIRATION RATE: 18 BRPM

## 2025-07-14 DIAGNOSIS — M79.602 PAIN OF LEFT UPPER EXTREMITY: Primary | ICD-10-CM

## 2025-07-14 LAB
ALBUMIN SERPL BCP-MCNC: 4.3 G/DL (ref 3.4–5)
ALP SERPL-CCNC: 66 U/L (ref 33–136)
ALT SERPL W P-5'-P-CCNC: 15 U/L (ref 7–45)
ANION GAP SERPL CALC-SCNC: 11 MMOL/L (ref 10–20)
AST SERPL W P-5'-P-CCNC: 29 U/L (ref 9–39)
BASOPHILS # BLD AUTO: 0.01 X10*3/UL (ref 0–0.1)
BASOPHILS NFR BLD AUTO: 0.1 %
BILIRUB SERPL-MCNC: 0.4 MG/DL (ref 0–1.2)
BUN SERPL-MCNC: 18 MG/DL (ref 6–23)
CALCIUM SERPL-MCNC: 9.1 MG/DL (ref 8.6–10.3)
CARDIAC TROPONIN I PNL SERPL HS: 6 NG/L (ref 0–13)
CHLORIDE SERPL-SCNC: 108 MMOL/L (ref 98–107)
CO2 SERPL-SCNC: 26 MMOL/L (ref 21–32)
CREAT SERPL-MCNC: 0.53 MG/DL (ref 0.5–1.05)
EGFRCR SERPLBLD CKD-EPI 2021: >90 ML/MIN/1.73M*2
EOSINOPHIL # BLD AUTO: 0.13 X10*3/UL (ref 0–0.4)
EOSINOPHIL NFR BLD AUTO: 1.7 %
ERYTHROCYTE [DISTWIDTH] IN BLOOD BY AUTOMATED COUNT: 13.5 % (ref 11.5–14.5)
GLUCOSE SERPL-MCNC: 86 MG/DL (ref 74–99)
HCT VFR BLD AUTO: 39.5 % (ref 36–46)
HGB BLD-MCNC: 12.5 G/DL (ref 12–16)
IMM GRANULOCYTES # BLD AUTO: 0.02 X10*3/UL (ref 0–0.5)
IMM GRANULOCYTES NFR BLD AUTO: 0.3 % (ref 0–0.9)
LYMPHOCYTES # BLD AUTO: 2.51 X10*3/UL (ref 0.8–3)
LYMPHOCYTES NFR BLD AUTO: 32.5 %
MCH RBC QN AUTO: 29.7 PG (ref 26–34)
MCHC RBC AUTO-ENTMCNC: 31.6 G/DL (ref 32–36)
MCV RBC AUTO: 94 FL (ref 80–100)
MONOCYTES # BLD AUTO: 0.7 X10*3/UL (ref 0.05–0.8)
MONOCYTES NFR BLD AUTO: 9.1 %
NEUTROPHILS # BLD AUTO: 4.36 X10*3/UL (ref 1.6–5.5)
NEUTROPHILS NFR BLD AUTO: 56.3 %
NRBC BLD-RTO: 0 /100 WBCS (ref 0–0)
PLATELET # BLD AUTO: 208 X10*3/UL (ref 150–450)
POTASSIUM SERPL-SCNC: 4.5 MMOL/L (ref 3.5–5.3)
PROT SERPL-MCNC: 6.9 G/DL (ref 6.4–8.2)
RBC # BLD AUTO: 4.21 X10*6/UL (ref 4–5.2)
SODIUM SERPL-SCNC: 140 MMOL/L (ref 136–145)
WBC # BLD AUTO: 7.7 X10*3/UL (ref 4.4–11.3)

## 2025-07-14 PROCEDURE — 72125 CT NECK SPINE W/O DYE: CPT

## 2025-07-14 PROCEDURE — 72125 CT NECK SPINE W/O DYE: CPT | Performed by: RADIOLOGY

## 2025-07-14 PROCEDURE — 93005 ELECTROCARDIOGRAM TRACING: CPT

## 2025-07-14 PROCEDURE — 2500000004 HC RX 250 GENERAL PHARMACY W/ HCPCS (ALT 636 FOR OP/ED): Mod: JW | Performed by: NURSE PRACTITIONER

## 2025-07-14 PROCEDURE — 99285 EMERGENCY DEPT VISIT HI MDM: CPT | Mod: 25

## 2025-07-14 PROCEDURE — 71045 X-RAY EXAM CHEST 1 VIEW: CPT | Performed by: INTERNAL MEDICINE

## 2025-07-14 PROCEDURE — 71045 X-RAY EXAM CHEST 1 VIEW: CPT

## 2025-07-14 PROCEDURE — 80053 COMPREHEN METABOLIC PANEL: CPT | Performed by: NURSE PRACTITIONER

## 2025-07-14 PROCEDURE — 73030 X-RAY EXAM OF SHOULDER: CPT | Mod: LEFT SIDE | Performed by: RADIOLOGY

## 2025-07-14 PROCEDURE — 73030 X-RAY EXAM OF SHOULDER: CPT | Mod: LT

## 2025-07-14 PROCEDURE — 96374 THER/PROPH/DIAG INJ IV PUSH: CPT

## 2025-07-14 PROCEDURE — 36415 COLL VENOUS BLD VENIPUNCTURE: CPT | Performed by: NURSE PRACTITIONER

## 2025-07-14 PROCEDURE — 85025 COMPLETE CBC W/AUTO DIFF WBC: CPT | Performed by: NURSE PRACTITIONER

## 2025-07-14 PROCEDURE — 84484 ASSAY OF TROPONIN QUANT: CPT | Performed by: NURSE PRACTITIONER

## 2025-07-14 RX ORDER — KETOROLAC TROMETHAMINE 30 MG/ML
15 INJECTION, SOLUTION INTRAMUSCULAR; INTRAVENOUS ONCE
Status: COMPLETED | OUTPATIENT
Start: 2025-07-14 | End: 2025-07-14

## 2025-07-14 RX ORDER — KETOROLAC TROMETHAMINE 30 MG/ML
30 INJECTION, SOLUTION INTRAMUSCULAR; INTRAVENOUS ONCE
Status: DISCONTINUED | OUTPATIENT
Start: 2025-07-14 | End: 2025-07-14

## 2025-07-14 RX ADMIN — KETOROLAC TROMETHAMINE 15 MG: 30 INJECTION, SOLUTION INTRAMUSCULAR at 08:21

## 2025-07-14 ASSESSMENT — PAIN DESCRIPTION - LOCATION: LOCATION: ARM

## 2025-07-14 ASSESSMENT — PAIN SCALES - GENERAL: PAINLEVEL_OUTOF10: 4

## 2025-07-14 ASSESSMENT — PAIN DESCRIPTION - FREQUENCY: FREQUENCY: CONSTANT/CONTINUOUS

## 2025-07-14 ASSESSMENT — PAIN DESCRIPTION - PAIN TYPE: TYPE: ACUTE PAIN

## 2025-07-14 ASSESSMENT — PAIN - FUNCTIONAL ASSESSMENT: PAIN_FUNCTIONAL_ASSESSMENT: 0-10

## 2025-07-14 ASSESSMENT — PAIN DESCRIPTION - ORIENTATION: ORIENTATION: LEFT

## 2025-07-14 NOTE — ED NOTES
PT VERBALIZES UNDERSTANDING OF DX AND IMPORTANCE OF FOLLOW UP. ALL PT QUESTIONS ANSWERED. IV REMOVED.     Lolis Morton, RADHA  07/14/25 0244

## 2025-07-14 NOTE — ED TRIAGE NOTES
Pt presents to department from  home via EMS with L arm for the past few months. Pt states pain is worse in the evening and has been causing her an inability to sleep. Pt denies any trauma or injury to the arm. Pt endorses hx of HTN VSS

## 2025-07-14 NOTE — DISCHARGE INSTRUCTIONS
Lab work, EKG and chest x-ray were all normal.  Low suspicion that your pain is cardiac in nature.  It seems to be due to arthritis of the cervical spine and left shoulder.  You are discharged home.  May use Tylenol arthritis at home.  Resume other normal medications.  May apply heat intermittently.  Follow-up with orthopedics in the next 2 to 3 days.  Call today to set up follow-up appointment.

## 2025-07-15 LAB
ATRIAL RATE: 64 BPM
P AXIS: 71 DEGREES
P OFFSET: 187 MS
P ONSET: 145 MS
PR INTERVAL: 144 MS
Q ONSET: 217 MS
QRS COUNT: 11 BEATS
QRS DURATION: 82 MS
QT INTERVAL: 420 MS
QTC CALCULATION(BAZETT): 433 MS
QTC FREDERICIA: 429 MS
R AXIS: 56 DEGREES
T AXIS: 48 DEGREES
T OFFSET: 427 MS
VENTRICULAR RATE: 64 BPM

## 2025-07-28 ENCOUNTER — OFFICE VISIT (OUTPATIENT)
Dept: ORTHOPEDIC SURGERY | Facility: CLINIC | Age: 75
End: 2025-07-28
Payer: MEDICARE

## 2025-07-28 DIAGNOSIS — M75.52 BURSITIS OF LEFT SHOULDER: ICD-10-CM

## 2025-07-28 PROCEDURE — 99212 OFFICE O/P EST SF 10 MIN: CPT | Performed by: ORTHOPAEDIC SURGERY

## 2025-07-28 PROCEDURE — 99204 OFFICE O/P NEW MOD 45 MIN: CPT | Performed by: ORTHOPAEDIC SURGERY

## 2025-07-28 PROCEDURE — 1159F MED LIST DOCD IN RCRD: CPT | Performed by: ORTHOPAEDIC SURGERY

## 2025-07-28 RX ORDER — DEXAMETHASONE 0.75 MG/1
0.75 TABLET ORAL
Qty: 28 TABLET | Refills: 0 | Status: SHIPPED | OUTPATIENT
Start: 2025-07-28 | End: 2025-08-11

## 2025-07-30 NOTE — PROGRESS NOTES
History of Present Illness  Chief Complaint   Patient presents with    Left Shoulder - New Patient Visit     ED/Imaging 25       Patient presents for evaluation regards to chronic left shoulder pain.  Patient states she has a known injury from prior issues with her left shoulder.  Patient is afraid of needles and is not interested in pursuing any treatment involving steroid injections at this time.    Medical History[1]    Medication Documentation Review Audit       Reviewed by Enid Muhammad MA (Medical Assistant) on 25 at 1458      Medication Order Taking? Sig Documenting Provider Last Dose Status   albuterol 90 mcg/actuation inhaler [id number]  Inhale 2 puffs every 6 hours if needed for wheezing. EVERY 4-6 HOURS Blayne Santiago MD  Active   alendronate (Fosamax) 70 mg tablet [id number]  Take 1 tablet (70 mg) by mouth every 7 days. Take in the morning with a full glass of water, on an empty stomach, and do not take anything else by mouth or lie down for the next 30 min. Blayne Santiago MD  Active   aspirin 81 mg EC tablet [id number]  Take 1 tablet (81 mg) by mouth once daily. Blayne Santiago MD  Active   busPIRone (Buspar) 5 mg tablet [id number] No Take 1 tablet (5 mg) by mouth 2 times a day. Blayne Santiago MD Taking Active   calcium carbonate 600 mg calcium (1,500 mg) tablet [id number]  Take 1 tablet (1,500 mg) by mouth 2 times a day. Blayne Santiago MD  Active   dexAMETHasone (Decadron) 4 mg tablet [id number]  Take 1 tablet (4 mg) by mouth 2 times a day for 1 day. Can MIR MD   24 [id number]   diclofenac sodium (Voltaren) 1 % gel [id number]  Apply 4.5 inches (4 g) topically 4 times a day as needed (Apply to left side of chest and over left hand 4 times a day as needed.). Ousmane Vázquez MD  Active   dicyclomine (Bentyl) 20 mg tablet [id number] No Take 1 tablet (20 mg) by mouth every 8 hours if needed (as needed). Blayne Santiago MD Taking Active   ergocalciferol  (Vitamin D-2) 1.25 MG (77295 UT) capsule [id number] No Take 1 capsule (1,250 mcg) by mouth every 30 (thirty) days. Blayne Santiago MD Taking Active   fluticasone (Flonase) 50 mcg/actuation nasal spray [id number]  Instill 2 sprays into each nostril once daily. Blayne Santiago MD  Active   levothyroxine (Synthroid, Levoxyl) 25 mcg tablet [id number]  Take 1 tablet by mouth once daily in the morning 30 minutes prior to breakfast. Blayne Santiago MD  Active   lidocaine (Lidoderm) 5 % patch [id number]  Place 1 patch over 12 hours on the skin once daily. Remove & discard patch within 12 hours or as directed by MD. Ousmane Vázquez MD  Active   loratadine (Claritin) 10 mg tablet [id number] No Take 1 tablet (10 mg) by mouth once daily. Blayne Santiago MD Taking Active   losartan (Cozaar) 100 mg tablet [id number] No Take 1 tablet (100 mg) by mouth once daily. Blayne Santiago MD Taking  04/15/25 2359   metoprolol succinate XL (Toprol-XL) 25 mg 24 hr tablet [id number]  Take 1 tablet by mouth once daily. Blayne Santiago MD  Active   montelukast (Singulair) 10 mg tablet [id number]  Take 1 tablet by mouth once daily at bedtime. Blayne Santiago MD  Active   multivitamin with minerals (multivit-min-iron fum-folic ac) tablet [id number] No Take 1 tablet by mouth once daily. Blayne Santiago MD Taking Active   omeprazole (PriLOSEC) 40 mg DR capsule [id number]  Take 1 capsule (40 mg) by mouth once daily in the morning. Take before meals. Blayne Santiago MD  Active                    RX Allergies[2]    Social History     Socioeconomic History    Marital status:      Spouse name: Not on file    Number of children: Not on file    Years of education: Not on file    Highest education level: Not on file   Occupational History    Not on file   Tobacco Use    Smoking status: Never    Smokeless tobacco: Never   Vaping Use    Vaping status: Never Used   Substance and Sexual Activity    Alcohol use: Not on file     Drug use: Not on file    Sexual activity: Not on file   Other Topics Concern    Not on file   Social History Narrative    Not on file     Social Drivers of Health     Financial Resource Strain: Not on file   Food Insecurity: Not on file   Transportation Needs: Not on file   Physical Activity: Not on file   Stress: Not on file   Social Connections: Not on file   Intimate Partner Violence: Not on file   Housing Stability: Low Risk (5/21/2023)    Received from [facility]     What is your living situation today?: I have a steady place to live     Think about the place you live. Do you have problems with:  Choose all that apply.: None of the above       Surgical History[3]         Review of Systems   GENERAL: Negative for malaise, significant weight loss, fever  MUSCULOSKELETAL: see HPI  NEURO:  Negative      Exam  Left upper extremity:   Passive external rotation of the left shoulder to 0 degrees  Passive internal rotation of the left shoulder to Sacrum  Forward flexion to 90 degrees  Tender to palpation about the Anterolateral Shoulder girdle  Painful range of motion with Terminal extents of limited motion.  Infraspinatus muscle appears to be intact, supraspinatus appears to be intact, subscapularis appears to be intact.  Neurovascularly intact distally     Imaging  AP, Grashey and scapular Y views of the left shoulder were independently reviewed from 7/14/2025    Findings demonstrate mild deformity appreciated about the left shoulder likely consistent with remote injury as well as advanced arthritic change about the left glenohumeral joint.       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  We discussed treatment options to address this and did discuss potential utilization of steroid injection of the left shoulder.  Patient does not wish to pursue any injection and we  discussed alternative modalities including oral steroid pills.  Patient elected to pursue this treatment modality.  In an effort to reduce inflammation recommend a brief course of oral steroid medication in the form of dexamethasone 0.75 mg to be taken 3 times daily for the next 14 days.  Patient was counseled at risk of utilization of oral steroid medication and the potential benefits to reduce inflammation.  Will see patient back in 1 month if symptoms persist I discussed additional modalities including potential utilization of physical therapy at that time.  At this juncture, patient will require further evaluation, treatment and follow-up to determine ultimate prognosis and at this point this remains uncertain.          [1]   Past Medical History:  Diagnosis Date    Age-related osteoporosis without current pathological fracture 11/07/2013    Osteoporosis    Allergy, unspecified, initial encounter 11/07/2013    Allergic reaction    Cellulitis, unspecified 11/07/2013    Cellulitis    Chronic frontal sinusitis 11/07/2013    Chronic frontal sinusitis    Conjunctival hemorrhage, unspecified eye 11/07/2013    Subconjunctival hemorrhage    Contusion of unspecified thigh, initial encounter 11/07/2013    Contusion of thigh    Depression, unspecified 11/07/2013    Depression    Disorder of the skin and subcutaneous tissue, unspecified 11/07/2013    Skin disorder    Encounter for immunization 11/07/2013    Need for DTP vaccine    Encounter for screening for malignant neoplasm of colon     Encounter for screening colonoscopy    Epistaxis 11/07/2013    Epistaxis    Erythema intertrigo 11/07/2013    Intertrigo    Flushing 11/07/2013    Flushing    Furuncle of perineum 11/07/2013    Furuncle of perineum    Gastritis, unspecified, without bleeding 11/07/2013    Gastritis    Herpesviral infection, unspecified 11/07/2013    Herpes simplex    Noninfective gastroenteritis and colitis, unspecified 11/07/2013    Noninfectious  gastroenteritis    Other allergy status, other than to drugs and biological substances     History of environmental allergies    Other conditions influencing health status 11/07/2013    A Fall    Other conditions influencing health status 11/07/2013    Rodent Bite    Other fatigue 11/07/2013    Fatigue    Other specified disorders of nose and nasal sinuses 10/19/2020    Nasal valve stenosis    Other vitreous opacities, unspecified eye 11/07/2013    Vitreous floaters    Otitis media, unspecified, unspecified ear 11/07/2013    Otitis media    Pain in unspecified hip 11/07/2013    Joint pain, hip    Person consulting for explanation of examination or test findings     Visit for review of DEXA scan    Personal history of other diseases of the circulatory system     History of hypertension    Personal history of other diseases of the female genital tract 10/04/2017    History of postmenopausal bleeding    Personal history of other diseases of the female genital tract 02/26/2016    History of postmenopausal bleeding    Personal history of other diseases of the musculoskeletal system and connective tissue 11/07/2013    History of backache    Personal history of other diseases of the respiratory system 09/28/2022    History of deviated nasal septum    Personal history of other diseases of the respiratory system 04/14/2015    History of acute sinusitis    Personal history of other medical treatment     History of mammogram    Personal history of other specified conditions     History of snoring    Pneumonia, unspecified organism 11/07/2013    Pneumonitis    Postmenopausal bleeding 11/07/2013    Postmenopausal bleeding    Tinea pedis 11/07/2013    Tinea pedis    Unspecified asthma, uncomplicated ([facility]) 11/07/2013    Asthma    Unspecified blepharitis unspecified eye, unspecified eyelid 11/07/2013    Blepharitis    Unspecified hemorrhoids 11/07/2013    Hemorrhoids    Unspecified mental disorder due to known physiological  condition 09/20/2017    Cognitive dysfunction    Unspecified nonsuppurative otitis media, unspecified ear 11/07/2013    Nonsuppurative otitis media    Unspecified open wound of unspecified hand, initial encounter 11/07/2013    Open wound of hand    Unspecified osteoarthritis, unspecified site 07/02/2014    Osteoarthrosis, unspecified whether generalized or localized, other specified sites    Wedge compression fracture of T11-T12 vertebra, initial encounter for closed fracture (Multi) 12/01/2021    Wedge compression fracture of T11-T12 vertebra, initial encounter for closed fracture   [2]   Allergies  Allergen Reactions    Pneumococcal Vaccine Unknown    Pneumovax-23 [Pneumococcal 23-Wendi Ps Vaccine] Unknown    Sulfa (Sulfonamide Antibiotics) Unknown   [3]   Past Surgical History:  Procedure Laterality Date    HYSTERECTOMY  02/26/2016    Hysterectomy    OTHER SURGICAL HISTORY  03/03/2014    Vaccines Prophylactic Need Against Bacterial Diseases    OTHER SURGICAL HISTORY  07/18/2018    Reported Hx Of Hip Replacement - Bilateral    OTHER SURGICAL HISTORY  11/07/2022    Arm surgery    OTHER SURGICAL HISTORY  02/26/2016    Brain Surgery    TUBAL LIGATION  10/04/2017    Tubal Ligation

## 2025-08-20 ENCOUNTER — TELEPHONE (OUTPATIENT)
Dept: ORTHOPEDIC SURGERY | Facility: CLINIC | Age: 75
End: 2025-08-20
Payer: MEDICARE

## 2025-08-20 DIAGNOSIS — M75.52 BURSITIS OF LEFT SHOULDER: ICD-10-CM

## 2025-08-20 RX ORDER — DEXAMETHASONE 0.75 MG/1
0.75 TABLET ORAL
Qty: 28 TABLET | Refills: 0 | Status: SHIPPED | OUTPATIENT
Start: 2025-08-20 | End: 2025-09-03

## 2025-08-25 ENCOUNTER — OFFICE VISIT (OUTPATIENT)
Dept: ORTHOPEDIC SURGERY | Facility: CLINIC | Age: 75
End: 2025-08-25
Payer: MEDICARE

## 2025-08-25 DIAGNOSIS — M75.52 BURSITIS OF LEFT SHOULDER: Primary | ICD-10-CM

## 2025-08-25 PROCEDURE — 99212 OFFICE O/P EST SF 10 MIN: CPT | Performed by: ORTHOPAEDIC SURGERY

## 2025-08-25 PROCEDURE — 99213 OFFICE O/P EST LOW 20 MIN: CPT | Performed by: ORTHOPAEDIC SURGERY

## 2025-09-16 ENCOUNTER — APPOINTMENT (OUTPATIENT)
Dept: PRIMARY CARE | Facility: CLINIC | Age: 75
End: 2025-09-16
Payer: MEDICARE

## 2025-09-24 ENCOUNTER — APPOINTMENT (OUTPATIENT)
Dept: PRIMARY CARE | Facility: CLINIC | Age: 75
End: 2025-09-24
Payer: MEDICARE